# Patient Record
Sex: MALE | Race: WHITE | NOT HISPANIC OR LATINO
[De-identification: names, ages, dates, MRNs, and addresses within clinical notes are randomized per-mention and may not be internally consistent; named-entity substitution may affect disease eponyms.]

---

## 2017-08-19 ENCOUNTER — TRANSCRIPTION ENCOUNTER (OUTPATIENT)
Age: 52
End: 2017-08-19

## 2019-07-08 ENCOUNTER — APPOINTMENT (OUTPATIENT)
Dept: OTOLARYNGOLOGY | Facility: CLINIC | Age: 54
End: 2019-07-08

## 2019-10-03 ENCOUNTER — TRANSCRIPTION ENCOUNTER (OUTPATIENT)
Age: 54
End: 2019-10-03

## 2019-10-25 ENCOUNTER — INPATIENT (INPATIENT)
Facility: HOSPITAL | Age: 54
LOS: 11 days | Discharge: HOME HEALTH | End: 2019-11-06
Attending: INTERNAL MEDICINE | Admitting: INTERNAL MEDICINE
Payer: COMMERCIAL

## 2019-10-25 VITALS
WEIGHT: 315 LBS | HEIGHT: 70 IN | TEMPERATURE: 98 F | SYSTOLIC BLOOD PRESSURE: 114 MMHG | DIASTOLIC BLOOD PRESSURE: 81 MMHG | HEART RATE: 170 BPM | RESPIRATION RATE: 20 BRPM | OXYGEN SATURATION: 98 %

## 2019-10-25 PROCEDURE — 99285 EMERGENCY DEPT VISIT HI MDM: CPT

## 2019-10-25 PROCEDURE — 93010 ELECTROCARDIOGRAM REPORT: CPT

## 2019-10-25 RX ORDER — DILTIAZEM HCL 120 MG
10 CAPSULE, EXT RELEASE 24 HR ORAL ONCE
Refills: 0 | Status: COMPLETED | OUTPATIENT
Start: 2019-10-25 | End: 2019-10-25

## 2019-10-25 NOTE — ED ADULT TRIAGE NOTE - CHIEF COMPLAINT QUOTE
Sent in BIBA from Urgent care for sob on excretion, pt placed on 4L ns by ems for SOB. pt had a  in Rapid A fib. Sent in BIBA from Urgent care for sob on excretion, pt placed on 4L ns by ems for SOB. pt had a  in Rapid A fib. pt had an ablation 5 years ago for hx of rapid a flutter

## 2019-10-26 DIAGNOSIS — Z98.890 OTHER SPECIFIED POSTPROCEDURAL STATES: Chronic | ICD-10-CM

## 2019-10-26 DIAGNOSIS — R09.89 OTHER SPECIFIED SYMPTOMS AND SIGNS INVOLVING THE CIRCULATORY AND RESPIRATORY SYSTEMS: ICD-10-CM

## 2019-10-26 DIAGNOSIS — Z94.5 SKIN TRANSPLANT STATUS: Chronic | ICD-10-CM

## 2019-10-26 LAB
ALBUMIN SERPL ELPH-MCNC: 3.1 G/DL — LOW (ref 3.5–5.2)
ALBUMIN SERPL ELPH-MCNC: 3.4 G/DL — LOW (ref 3.5–5.2)
ALP SERPL-CCNC: 65 U/L — SIGNIFICANT CHANGE UP (ref 30–115)
ALP SERPL-CCNC: 71 U/L — SIGNIFICANT CHANGE UP (ref 30–115)
ALT FLD-CCNC: 44 U/L — HIGH (ref 0–41)
ALT FLD-CCNC: 46 U/L — HIGH (ref 0–41)
ANION GAP SERPL CALC-SCNC: 15 MMOL/L — HIGH (ref 7–14)
ANION GAP SERPL CALC-SCNC: 17 MMOL/L — HIGH (ref 7–14)
APTT BLD: 28.5 SEC — SIGNIFICANT CHANGE UP (ref 27–39.2)
APTT BLD: 35.9 SEC — SIGNIFICANT CHANGE UP (ref 27–39.2)
APTT BLD: 91.7 SEC — CRITICAL HIGH (ref 27–39.2)
AST SERPL-CCNC: 43 U/L — HIGH (ref 0–41)
AST SERPL-CCNC: 45 U/L — HIGH (ref 0–41)
BASE EXCESS BLDV CALC-SCNC: -5 MMOL/L — LOW (ref -2–2)
BASOPHILS # BLD AUTO: 0.11 K/UL — SIGNIFICANT CHANGE UP (ref 0–0.2)
BASOPHILS NFR BLD AUTO: 1 % — SIGNIFICANT CHANGE UP (ref 0–1)
BILIRUB SERPL-MCNC: 1.1 MG/DL — SIGNIFICANT CHANGE UP (ref 0.2–1.2)
BILIRUB SERPL-MCNC: 1.1 MG/DL — SIGNIFICANT CHANGE UP (ref 0.2–1.2)
BUN SERPL-MCNC: 49 MG/DL — HIGH (ref 10–20)
BUN SERPL-MCNC: 51 MG/DL — HIGH (ref 10–20)
CA-I SERPL-SCNC: 1.17 MMOL/L — SIGNIFICANT CHANGE UP (ref 1.12–1.3)
CALCIUM SERPL-MCNC: 8.7 MG/DL — SIGNIFICANT CHANGE UP (ref 8.5–10.1)
CALCIUM SERPL-MCNC: 9.1 MG/DL — SIGNIFICANT CHANGE UP (ref 8.5–10.1)
CHLORIDE SERPL-SCNC: 104 MMOL/L — SIGNIFICANT CHANGE UP (ref 98–110)
CHLORIDE SERPL-SCNC: 105 MMOL/L — SIGNIFICANT CHANGE UP (ref 98–110)
CHOLEST SERPL-MCNC: 72 MG/DL — LOW (ref 100–200)
CO2 SERPL-SCNC: 19 MMOL/L — SIGNIFICANT CHANGE UP (ref 17–32)
CO2 SERPL-SCNC: 20 MMOL/L — SIGNIFICANT CHANGE UP (ref 17–32)
CREAT SERPL-MCNC: 1.7 MG/DL — HIGH (ref 0.7–1.5)
CREAT SERPL-MCNC: 2.1 MG/DL — HIGH (ref 0.7–1.5)
D DIMER BLD IA.RAPID-MCNC: 786 NG/ML DDU — HIGH (ref 0–230)
EOSINOPHIL # BLD AUTO: 0.12 K/UL — SIGNIFICANT CHANGE UP (ref 0–0.7)
EOSINOPHIL NFR BLD AUTO: 1.1 % — SIGNIFICANT CHANGE UP (ref 0–8)
ESTIMATED AVERAGE GLUCOSE: 131 MG/DL — HIGH (ref 68–114)
GAS PNL BLDV: 138 MMOL/L — SIGNIFICANT CHANGE UP (ref 136–145)
GAS PNL BLDV: SIGNIFICANT CHANGE UP
GLUCOSE SERPL-MCNC: 97 MG/DL — SIGNIFICANT CHANGE UP (ref 70–99)
GLUCOSE SERPL-MCNC: 99 MG/DL — SIGNIFICANT CHANGE UP (ref 70–99)
HBA1C BLD-MCNC: 6.2 % — HIGH (ref 4–5.6)
HCO3 BLDV-SCNC: 21 MMOL/L — LOW (ref 22–29)
HCT VFR BLD CALC: 42.6 % — SIGNIFICANT CHANGE UP (ref 42–52)
HCT VFR BLD CALC: 45.6 % — SIGNIFICANT CHANGE UP (ref 42–52)
HCT VFR BLDA CALC: 45.2 % — HIGH (ref 34–44)
HDLC SERPL-MCNC: 17 MG/DL — LOW
HGB BLD CALC-MCNC: 14.8 G/DL — SIGNIFICANT CHANGE UP (ref 14–18)
HGB BLD-MCNC: 13.2 G/DL — LOW (ref 14–18)
HGB BLD-MCNC: 14.1 G/DL — SIGNIFICANT CHANGE UP (ref 14–18)
IMM GRANULOCYTES NFR BLD AUTO: 0.5 % — HIGH (ref 0.1–0.3)
INR BLD: 1.48 RATIO — HIGH (ref 0.65–1.3)
LACTATE BLDV-MCNC: 2.5 MMOL/L — HIGH (ref 0.5–1.6)
LIPID PNL WITH DIRECT LDL SERPL: 47 MG/DL — SIGNIFICANT CHANGE UP (ref 4–129)
LYMPHOCYTES # BLD AUTO: 2.66 K/UL — SIGNIFICANT CHANGE UP (ref 1.2–3.4)
LYMPHOCYTES # BLD AUTO: 23.7 % — SIGNIFICANT CHANGE UP (ref 20.5–51.1)
MAGNESIUM SERPL-MCNC: 1.9 MG/DL — SIGNIFICANT CHANGE UP (ref 1.8–2.4)
MCHC RBC-ENTMCNC: 28.1 PG — SIGNIFICANT CHANGE UP (ref 27–31)
MCHC RBC-ENTMCNC: 28.1 PG — SIGNIFICANT CHANGE UP (ref 27–31)
MCHC RBC-ENTMCNC: 30.9 G/DL — LOW (ref 32–37)
MCHC RBC-ENTMCNC: 31 G/DL — LOW (ref 32–37)
MCV RBC AUTO: 90.6 FL — SIGNIFICANT CHANGE UP (ref 80–94)
MCV RBC AUTO: 91 FL — SIGNIFICANT CHANGE UP (ref 80–94)
MONOCYTES # BLD AUTO: 1.21 K/UL — HIGH (ref 0.1–0.6)
MONOCYTES NFR BLD AUTO: 10.8 % — HIGH (ref 1.7–9.3)
NEUTROPHILS # BLD AUTO: 7.07 K/UL — HIGH (ref 1.4–6.5)
NEUTROPHILS NFR BLD AUTO: 62.9 % — SIGNIFICANT CHANGE UP (ref 42.2–75.2)
NRBC # BLD: 0 /100 WBCS — SIGNIFICANT CHANGE UP (ref 0–0)
NRBC # BLD: 0 /100 WBCS — SIGNIFICANT CHANGE UP (ref 0–0)
NT-PROBNP SERPL-SCNC: 3531 PG/ML — HIGH (ref 0–300)
PCO2 BLDV: 43 MMHG — SIGNIFICANT CHANGE UP (ref 41–51)
PH BLDV: 7.3 — SIGNIFICANT CHANGE UP (ref 7.26–7.43)
PLATELET # BLD AUTO: 211 K/UL — SIGNIFICANT CHANGE UP (ref 130–400)
PLATELET # BLD AUTO: 224 K/UL — SIGNIFICANT CHANGE UP (ref 130–400)
PO2 BLDV: 36 MMHG — SIGNIFICANT CHANGE UP (ref 20–40)
POTASSIUM BLDV-SCNC: 4.5 MMOL/L — SIGNIFICANT CHANGE UP (ref 3.3–5.6)
POTASSIUM SERPL-MCNC: 4.6 MMOL/L — SIGNIFICANT CHANGE UP (ref 3.5–5)
POTASSIUM SERPL-MCNC: 5 MMOL/L — SIGNIFICANT CHANGE UP (ref 3.5–5)
POTASSIUM SERPL-SCNC: 4.6 MMOL/L — SIGNIFICANT CHANGE UP (ref 3.5–5)
POTASSIUM SERPL-SCNC: 5 MMOL/L — SIGNIFICANT CHANGE UP (ref 3.5–5)
PROT SERPL-MCNC: 5.4 G/DL — LOW (ref 6–8)
PROT SERPL-MCNC: 5.8 G/DL — LOW (ref 6–8)
PROTHROM AB SERPL-ACNC: 17 SEC — HIGH (ref 9.95–12.87)
RBC # BLD: 4.7 M/UL — SIGNIFICANT CHANGE UP (ref 4.7–6.1)
RBC # BLD: 5.01 M/UL — SIGNIFICANT CHANGE UP (ref 4.7–6.1)
RBC # FLD: 16.2 % — HIGH (ref 11.5–14.5)
RBC # FLD: 16.2 % — HIGH (ref 11.5–14.5)
SAO2 % BLDV: 61 % — SIGNIFICANT CHANGE UP
SODIUM SERPL-SCNC: 140 MMOL/L — SIGNIFICANT CHANGE UP (ref 135–146)
SODIUM SERPL-SCNC: 140 MMOL/L — SIGNIFICANT CHANGE UP (ref 135–146)
TOTAL CHOLESTEROL/HDL RATIO MEASUREMENT: 4.2 RATIO — SIGNIFICANT CHANGE UP (ref 4–5.5)
TRIGL SERPL-MCNC: 64 MG/DL — SIGNIFICANT CHANGE UP (ref 10–149)
TROPONIN T SERPL-MCNC: <0.01 NG/ML — SIGNIFICANT CHANGE UP
TROPONIN T SERPL-MCNC: <0.01 NG/ML — SIGNIFICANT CHANGE UP
TSH SERPL-MCNC: 1.99 UIU/ML — SIGNIFICANT CHANGE UP (ref 0.27–4.2)
WBC # BLD: 11.23 K/UL — HIGH (ref 4.8–10.8)
WBC # BLD: 12.91 K/UL — HIGH (ref 4.8–10.8)
WBC # FLD AUTO: 11.23 K/UL — HIGH (ref 4.8–10.8)
WBC # FLD AUTO: 12.91 K/UL — HIGH (ref 4.8–10.8)

## 2019-10-26 PROCEDURE — 93970 EXTREMITY STUDY: CPT | Mod: 26

## 2019-10-26 PROCEDURE — 99222 1ST HOSP IP/OBS MODERATE 55: CPT

## 2019-10-26 PROCEDURE — 99223 1ST HOSP IP/OBS HIGH 75: CPT

## 2019-10-26 PROCEDURE — 71045 X-RAY EXAM CHEST 1 VIEW: CPT | Mod: 26

## 2019-10-26 RX ORDER — HEPARIN SODIUM 5000 [USP'U]/ML
10000 INJECTION INTRAVENOUS; SUBCUTANEOUS EVERY 6 HOURS
Refills: 0 | Status: DISCONTINUED | OUTPATIENT
Start: 2019-10-26 | End: 2019-10-26

## 2019-10-26 RX ORDER — HEPARIN SODIUM 5000 [USP'U]/ML
5000 INJECTION INTRAVENOUS; SUBCUTANEOUS EVERY 6 HOURS
Refills: 0 | Status: DISCONTINUED | OUTPATIENT
Start: 2019-10-26 | End: 2019-10-26

## 2019-10-26 RX ORDER — METOPROLOL TARTRATE 50 MG
25 TABLET ORAL
Refills: 0 | Status: DISCONTINUED | OUTPATIENT
Start: 2019-10-26 | End: 2019-10-26

## 2019-10-26 RX ORDER — METOPROLOL TARTRATE 50 MG
25 TABLET ORAL EVERY 8 HOURS
Refills: 0 | Status: DISCONTINUED | OUTPATIENT
Start: 2019-10-26 | End: 2019-10-29

## 2019-10-26 RX ORDER — DILTIAZEM HCL 120 MG
10 CAPSULE, EXT RELEASE 24 HR ORAL ONCE
Refills: 0 | Status: COMPLETED | OUTPATIENT
Start: 2019-10-26 | End: 2019-10-26

## 2019-10-26 RX ORDER — LANOLIN ALCOHOL/MO/W.PET/CERES
3 CREAM (GRAM) TOPICAL ONCE
Refills: 0 | Status: COMPLETED | OUTPATIENT
Start: 2019-10-26 | End: 2019-10-26

## 2019-10-26 RX ORDER — FUROSEMIDE 40 MG
40 TABLET ORAL DAILY
Refills: 0 | Status: DISCONTINUED | OUTPATIENT
Start: 2019-10-26 | End: 2019-10-29

## 2019-10-26 RX ORDER — ZOLPIDEM TARTRATE 10 MG/1
5 TABLET ORAL ONCE
Refills: 0 | Status: DISCONTINUED | OUTPATIENT
Start: 2019-10-26 | End: 2019-10-26

## 2019-10-26 RX ORDER — HEPARIN SODIUM 5000 [USP'U]/ML
INJECTION INTRAVENOUS; SUBCUTANEOUS
Qty: 25000 | Refills: 0 | Status: DISCONTINUED | OUTPATIENT
Start: 2019-10-26 | End: 2019-10-26

## 2019-10-26 RX ORDER — DILTIAZEM HCL 120 MG
5 CAPSULE, EXT RELEASE 24 HR ORAL
Qty: 125 | Refills: 0 | Status: DISCONTINUED | OUTPATIENT
Start: 2019-10-26 | End: 2019-10-26

## 2019-10-26 RX ORDER — LANOLIN ALCOHOL/MO/W.PET/CERES
5 CREAM (GRAM) TOPICAL ONCE
Refills: 0 | Status: COMPLETED | OUTPATIENT
Start: 2019-10-26 | End: 2019-10-26

## 2019-10-26 RX ORDER — SODIUM CHLORIDE 9 MG/ML
1000 INJECTION, SOLUTION INTRAVENOUS ONCE
Refills: 0 | Status: COMPLETED | OUTPATIENT
Start: 2019-10-26 | End: 2019-10-26

## 2019-10-26 RX ORDER — APIXABAN 2.5 MG/1
5 TABLET, FILM COATED ORAL EVERY 12 HOURS
Refills: 0 | Status: DISCONTINUED | OUTPATIENT
Start: 2019-10-26 | End: 2019-11-06

## 2019-10-26 RX ORDER — DILTIAZEM HCL 120 MG
15 CAPSULE, EXT RELEASE 24 HR ORAL
Qty: 125 | Refills: 0 | Status: DISCONTINUED | OUTPATIENT
Start: 2019-10-26 | End: 2019-10-30

## 2019-10-26 RX ORDER — HEPARIN SODIUM 5000 [USP'U]/ML
10000 INJECTION INTRAVENOUS; SUBCUTANEOUS ONCE
Refills: 0 | Status: COMPLETED | OUTPATIENT
Start: 2019-10-26 | End: 2019-10-26

## 2019-10-26 RX ADMIN — Medication 5 MILLIGRAM(S): at 04:43

## 2019-10-26 RX ADMIN — Medication 5 MG/HR: at 00:25

## 2019-10-26 RX ADMIN — HEPARIN SODIUM 2400 UNIT(S)/HR: 5000 INJECTION INTRAVENOUS; SUBCUTANEOUS at 02:56

## 2019-10-26 RX ADMIN — SODIUM CHLORIDE 1000 MILLILITER(S): 9 INJECTION, SOLUTION INTRAVENOUS at 01:32

## 2019-10-26 RX ADMIN — Medication 10 MILLIGRAM(S): at 00:10

## 2019-10-26 RX ADMIN — Medication 25 MILLIGRAM(S): at 22:12

## 2019-10-26 RX ADMIN — Medication 40 MILLIGRAM(S): at 14:50

## 2019-10-26 RX ADMIN — Medication 10 MILLIGRAM(S): at 00:17

## 2019-10-26 RX ADMIN — HEPARIN SODIUM 10000 UNIT(S): 5000 INJECTION INTRAVENOUS; SUBCUTANEOUS at 02:54

## 2019-10-26 RX ADMIN — APIXABAN 5 MILLIGRAM(S): 2.5 TABLET, FILM COATED ORAL at 22:12

## 2019-10-26 RX ADMIN — Medication 25 MILLIGRAM(S): at 14:51

## 2019-10-26 RX ADMIN — Medication 15 MG/HR: at 04:20

## 2019-10-26 RX ADMIN — Medication 15 MG/HR: at 07:43

## 2019-10-26 RX ADMIN — SODIUM CHLORIDE 1000 MILLILITER(S): 9 INJECTION, SOLUTION INTRAVENOUS at 00:18

## 2019-10-26 RX ADMIN — Medication 25 MILLIGRAM(S): at 07:44

## 2019-10-26 RX ADMIN — Medication 15 MG/HR: at 16:57

## 2019-10-26 RX ADMIN — ZOLPIDEM TARTRATE 5 MILLIGRAM(S): 10 TABLET ORAL at 22:35

## 2019-10-26 NOTE — ED PROVIDER NOTE - CARE PLAN
Principal Discharge DX:	Atrial fibrillation with RVR  Secondary Diagnosis:	BUCKLEY (dyspnea on exertion)

## 2019-10-26 NOTE — ED ADULT NURSE NOTE - CHEST APPEARANCE
normal Area M Indication Text: Tumors in this location are included in Area M (cheek, forehead, scalp, neck, jawline and pretibial skin).  Mohs surgery is indicated for tumors 1 cm or larger in these anatomic locations.

## 2019-10-26 NOTE — H&P ADULT - NSHPLABSRESULTS_GEN_ALL_CORE
14.1   12.91 )-----------( 224      ( 26 Oct 2019 00:00 )             45.6       10-26    140  |  104  |  51<H>  ----------------------------<  99  5.0   |  19  |  2.1<H>    Ca    9.1      26 Oct 2019 00:00    TPro  5.8<L>  /  Alb  3.4<L>  /  TBili  1.1  /  DBili  x   /  AST  43<H>  /  ALT  46<H>  /  AlkPhos  71  10-26                  PT/INR - ( 26 Oct 2019 00:00 )   PT: 17.00 sec;   INR: 1.48 ratio         PTT - ( 26 Oct 2019 00:00 )  PTT:28.5 sec    Lactate Trend      CARDIAC MARKERS ( 26 Oct 2019 00:00 )  x     / <0.01 ng/mL / x     / x     / x            CAPILLARY BLOOD GLUCOSE      POCT Blood Glucose.: 93 mg/dL (25 Oct 2019 23:52)

## 2019-10-26 NOTE — ED ADULT NURSE NOTE - CHIEF COMPLAINT QUOTE
Sent in BIBA from Urgent care for sob on excretion, pt placed on 4L ns by ems for SOB. pt had a  in Rapid A fib. pt had an ablation 5 years ago for hx of rapid a flutter

## 2019-10-26 NOTE — ED ADULT NURSE NOTE - NSIMPLEMENTINTERV_GEN_ALL_ED
Implemented All Fall with Harm Risk Interventions:  [place] to call system. Call bell, personal items and telephone within reach. Instruct patient to call for assistance. Room bathroom lighting operational. Non-slip footwear when patient is off stretcher. Physically safe environment: no spills, clutter or unnecessary equipment. Stretcher in lowest position, wheels locked, appropriate side rails in place. Provide visual cue, wrist band, yellow gown, etc. Monitor gait and stability. Monitor for mental status changes and reorient to person, place, and time. Review medications for side effects contributing to fall risk. Reinforce activity limits and safety measures with patient and family. Provide visual clues: red socks.

## 2019-10-26 NOTE — CONSULT NOTE ADULT - SUBJECTIVE AND OBJECTIVE BOX
Patient is a 54y old  Male who presents with a chief complaint of       HPI:  53 y/o M w/ PMHx morbid obesity, typical AFL s/p typical  ablation (not AF) has not been on blood thinners since ablation p/w SOB for 3 weeks. Patient noted for the last 3 weeks he has had increased BUCKLEY. Endorses chest tightness when he is short of breath. Recently seen at urgent care and diagnosed with cellulitis of bilateral lower ext and treated with antibiotics. Patient also reports being treated for 'congestion of the lung' where he was coughing and producing sputum. Was given a course of doxycycline after which he reports he feels better with improvement in his coughing and sputum production. Currently denies coughing, shortness of breath, chest pain, nausea, vomiting, diarrhea.          PAST MEDICAL & SURGICAL HISTORY:  Atrial fibrillation and flutter  History of prostate surgery  S/P ablation of atrial fibrillation  H/O skin graft        MEDICATIONS  (STANDING):  diltiazem Infusion 15 mG/Hr (15 mL/Hr) IV Continuous <Continuous>  furosemide   Injectable 40 milliGRAM(s) IV Push daily  heparin  Infusion.  Unit(s)/Hr (24 mL/Hr) IV Continuous <Continuous>  metoprolol tartrate 25 milliGRAM(s) Oral every 8 hours    MEDICATIONS  (PRN):  heparin  Injectable 14983 Unit(s) IV Push every 6 hours PRN For aPTT less than 40  heparin  Injectable 5000 Unit(s) IV Push every 6 hours PRN For aPTT between 40 - 57      FAMILY HISTORY:  FH: myocardial infarction: in father at age 60      SOCIAL HISTORY:    CIGARETTES: none    ALCOHOL: none    Past Surgical History:  Typical AFL ablation    Allergies:    Augmentin (Other)  clindamycin (Other)  penicillins (Other)      REVIEW OF SYSTEMS:    CONSTITUTIONAL: No fever, weight loss, chills, shakes, or fatigue  EYES: No eye pain, visual disturbances, or discharge  BREASTS: No pain, masses, or nipple discharge  RESPIRATORY: No cough, wheezing, hemoptysis, or shortness of breath  CARDIOVASCULAR: No chest pain, dyspnea, palpitations, dizziness, syncope, paroxysmal nocturnal dyspnea, orthopnea, or arm or leg swelling  GASTROINTESTINAL: No abdominal  or epigastric pain, nausea, vomiting, hematemesis, diarrhea, constipation, melena or bright red blood.  GENITOURINARY: No dysuria, nocturia, hematuria, or urinary incontinence  NEUROLOGICAL: No headaches, memory loss, slurred speech, limb weakness, loss of strength, numbness, or tremors  SKIN: No itching, burning, rashes, or lesions   LYMPH NODES: No enlarged glands  ENDOCRINE: No heat or cold intolerance, or hair loss  MUSCULOSKELETAL: No joint pain or swelling, muscle, back, or extremity pain  PSYCHIATRIC: No depression, anxiety, or difficulty sleeping      Vital Signs Last 24 Hrs  T(C): 36.2 (26 Oct 2019 16:04), Max: 36.8 (26 Oct 2019 00:05)  T(F): 97.1 (26 Oct 2019 16:04), Max: 98.2 (26 Oct 2019 00:05)  HR: 90 (26 Oct 2019 16:04) (90 - 170)  BP: 123/83 (26 Oct 2019 16:04) (109/69 - 151/90)  BP(mean): --  RR: 20 (26 Oct 2019 16:04) (18 - 24)  SpO2: 98% (26 Oct 2019 03:32) (98% - 100%)    PHYSICAL EXAM:        GENERAL: In no apparent distress, well nourished, and hydrated.  HEAD:  Atraumatic, Normocephalic  EYES: EOMI, PERRLA, conjunctiva and sclera clear  ENMT: No tonsillar erythema, exudates, or enlargements; ist mucous membranes, Good dentition, No lesions  HEART: Irregular rate and rhythm; No murmurs, rubs, or gallops.  PULMONARY: Clear to auscultation and perfusion.  No rales, wheezing, or rhonchi bilaterally.  ABDOMEN: Soft, Nontender, Nondistended; Bowel sounds present  EXTREMITIES:  2+ Peripheral Pulses, No clubbing, cyanosis, or edema    NEUROLOGICAL: Grossly nonfocal      INTERPRETATION OF TELEMETRY:    ECG:    < from: 12 Lead ECG (10.25.19 @ 23:30) >    Ventricular Rate 159 BPM    Atrial Rate 166 BPM    QRS Duration 100 ms    Q-T Interval 248 ms    QTC Calculation(Bezet) 403 ms    R Axis 101 degrees    T Axis -51 degrees    Diagnosis Line Atrial fibrillation with rapid ventricular response  Rightward axis  Nonspecific T wave abnormality  Abnormal ECG    < end of copied text >      I&O's Detail    26 Oct 2019 07:01  -  26 Oct 2019 19:15  --------------------------------------------------------  IN:    Oral Fluid: 240 mL  Total IN: 240 mL    OUT:    Voided: 100 mL  Total OUT: 100 mL    Total NET: 140 mL          LABS:                        13.2   11.23 )-----------( 211      ( 26 Oct 2019 05:39 )             42.6     10-26    140  |  105  |  49<H>  ----------------------------<  97  4.6   |  20  |  1.7<H>    Ca    8.7      26 Oct 2019 05:39  Mg     1.9     10-26    TPro  5.4<L>  /  Alb  3.1<L>  /  TBili  1.1  /  DBili  x   /  AST  45<H>  /  ALT  44<H>  /  AlkPhos  65  10-26    CARDIAC MARKERS ( 26 Oct 2019 11:55 )  x     / <0.01 ng/mL / x     / x     / x      CARDIAC MARKERS ( 26 Oct 2019 00:00 )  x     / <0.01 ng/mL / x     / x     / x          PT/INR - ( 26 Oct 2019 00:00 )   PT: 17.00 sec;   INR: 1.48 ratio         PTT - ( 26 Oct 2019 11:55 )  PTT:91.7 sec    BNPSerum Pro-Brain Natriuretic Peptide: 3531 pg/mL (10-26 @ 00:00)    I&O's Detail    26 Oct 2019 07:01  -  26 Oct 2019 19:15  --------------------------------------------------------  IN:    Oral Fluid: 240 mL  Total IN: 240 mL    OUT:    Voided: 100 mL  Total OUT: 100 mL    Total NET: 140 mL        Daily Height in cm: 177.8 (26 Oct 2019 00:16)    Daily Weight in k.2 (26 Oct 2019 04:00)    RADIOLOGY & ADDITIONAL STUDIES:

## 2019-10-26 NOTE — ED PROVIDER NOTE - ATTENDING CONTRIBUTION TO CARE
54 year old male, pmhx of BMI greater than 35, patient weighs approx 560 pounds, comes in with afib rvr, patient has a history paroxysmal afib, no cp, + mild sob and BUCKLEY, no loc, no hemopstysis    CONSTITUTIONAL: Well-developed; well-nourished; in no acute distress. Sitting up and providing appropriate history and physical examination  SKIN: skin exam is warm and dry, no acute rash.  HEAD: Normocephalic; atraumatic.  EYES: PERRL, 3 mm bilateral, no nystagmus, EOM intact; conjunctiva and sclera clear.  ENT: No nasal discharge; airway clear.  NECK: Supple; non tender. + full passive ROM in all directions. No JVD  CARD: S1, S2 normal; no murmurs, gallops, or rubs. + AFIB RVR. + Symmetric Strong Pulses  RESP: No wheezes, rales or rhonchi. Good air movement bilaterally  ABD: soft; non-distended; non-tender. No Rebound, No Guarding, No signs of peritonitis, No CVA tenderness. No pulsatile abdominal mass. + Strong and Symmetric Pulses  EXT: Normal ROM. No clubbing, cyanosis or edema. Dp and Pt Pulses intact. Cap refill less than 3 seconds  NEURO: CN 2-12 intact, normal finger to nose, normal romberg, stable gait, no sensory or motor deficits, Alert, oriented, grossly unremarkable. No Focal deficits. GCS 15. NIH 0  PSYCH: Cooperative, appropriate.

## 2019-10-26 NOTE — ED PROVIDER NOTE - OBJECTIVE STATEMENT
54 year old male w/ a pmh of afib/flutter in the past s/p ablation has not been on blood thinners since ablation presents here c/o sob. Patient noted for the last 3 weeks he has had increased BUCKLEY. Patient becomes sob when taking 2 steps. Patient was found to be sating in the 80s by ems when he had to walk to the EMS stretched. Patient was placed on NC 02 and improved to high 90s. Patient endorses cp with exertion but no cp at rest. Patient states last week he had a cough. Denies any fever chills n/v abdominal pain urinary frequency urgency or burning.

## 2019-10-26 NOTE — CONSULT NOTE ADULT - ASSESSMENT
AF with RVR - new onset AF with RVR CHADVASC 0  - start eliqust  - BO/DCCV on Monday  - Pulmonary for MARTIN evolution as outpatient  - cont rate control for now with cardiozem

## 2019-10-26 NOTE — ED ADULT NURSE NOTE - OBJECTIVE STATEMENT
Pt with complaints of SOB even just walking 2 steps. Pt has been having dyspnea on exertion for the past 3 weeks. Pt has been treated for URI for the past week. Pt still with slight cough, denies any fever. Pt also with swelling to both legs up to abdomen and  stasis ulcer to both legs x 3 weeks.

## 2019-10-26 NOTE — H&P ADULT - HISTORY OF PRESENT ILLNESS
53 y/o M w/ PMHx of afib/flutter in the past s/p ablation has not been on blood thinners since ablation presents here c/o sob. Patient noted for the last 3 weeks he has had increased BUCKLEY. Patient becomes sob when taking 2 steps. Patient was found to be sating in the 80s by ems when he had to walk to the EMS stretched. Patient was placed on NC 02 and improved to high 90s. Patient endorses cp with exertion but no cp at rest. Patient states last week he had a cough. Denies any fever chills n/v abdominal pain urinary frequency urgency or burning. 55 y/o M w/ PMHx morbid obesity, afib/flutter in the past s/p ablation has not been on blood thinners since ablation p/w SOB for 3 weeks. Patient noted for the last 3 weeks he has had increased BUCKLEY. Endorses chest tightness when he is short of breath. Recently seen at urgent care and diagnosed with cellulitis of bilateral lower ext 53 y/o M w/ PMHx morbid obesity, afib/flutter in the past s/p ablation has not been on blood thinners since ablation p/w SOB for 3 weeks. Patient noted for the last 3 weeks he has had increased BUCKLEY. Endorses chest tightness when he is short of breath. Recently seen at urgent care and diagnosed with cellulitis of bilateral lower ext and treated with antibiotics. Patient also reports being treated for 'congestion of the lung' where he was coughing and producing sputum. Was given a course of doxycycline after which he reports he feels better with improvement in his coughing and sputum production. Currently denies coughing, shortness of breath, chest pain, nausea, vomiting, diarrhea.

## 2019-10-26 NOTE — H&P ADULT - NSHPPHYSICALEXAM_GEN_ALL_CORE
PHYSICAL EXAM:  GENERAL: NAD, well-developed  HEAD:  Atraumatic, Normocephalic  EYES: EOMI, PERRLA, conjunctiva and sclera clear  NECK: Supple, No JVD  CHEST/LUNG: Clear to auscultation bilaterally; No wheeze, ronchi or rales  HEART: Regular rate and rhythm; No murmurs, rubs, or gallops  ABDOMEN: Soft, Nontender, Nondistended; Bowel sounds present  EXTREMITIES:  2+ Peripheral Pulses, No clubbing, cyanosis, or edema  PSYCH: AAOx3  NEUROLOGY: non-focal  SKIN: No rashes or lesions

## 2019-10-26 NOTE — H&P ADULT - ASSESSMENT
#Afib RVR  - s/p 20mg IV cardizem and now on cardizem drip on max 15mg  - will start 25mg metoprolol  - f/u TSH  - cardiology consult  - heparin drip    #SOB  - PE vs. volume overload  - elevated D-dimer 786 on presentation  - due to patients body habitus cannot go for CT angio  - BNP elevated - cannot rule out heart failure as well  - heparin drip  - f/u lower ext duplex  - f/u ECHO  - Will put in for V/Q scan if possible w/ patient body habitus    #GONZALEZ  - creatinine 2.1 on presentation  - no known history of CKD  - prerenal vs cardiorenal?  - f/u repeat in AM    #Leukocytosis  - possibly secondary to lower ext cellulitis  - patient has chronic lower ext swelling - more recently     #Diet: Regular  #DVT ppx: Heparin  #Activity: ambulate with assistance #Afib RVR  - s/p 20mg IV cardizem and now on cardizem drip on max 15mg  - will start 25mg metoprolol  - f/u TSH  - cardiology consult  - heparin drip    #SOB  - PE vs. volume overload  - elevated D-dimer 786 on presentation  - due to patients body habitus cannot go for CT angio  - BNP elevated - cannot rule out heart failure as well  - heparin drip  - f/u lower ext duplex  - f/u ECHO  - Will put in for V/Q scan if possible w/ patient body habitus    #GONZALEZ  - creatinine 2.1 on presentation  - no known history of CKD  - prerenal vs cardiorenal?  - s/p 1L in ED  - f/u repeat in AM    #Leukocytosis  - possibly secondary to lower ext cellulitis  - patient has chronic lower ext swelling - more recently more erythema and now weeping - possible cellulitis?  - patient also treated for pneumonia  - currently afebrile  - repeat CBC going down, no left shift  - will hold off antibiotics for now    #Diet: Regular  #DVT ppx: Heparin  #Activity: ambulate with assistance

## 2019-10-26 NOTE — H&P ADULT - NSICDXPASTSURGICALHX_GEN_ALL_CORE_FT
PAST SURGICAL HISTORY:  H/O skin graft     History of prostate surgery     S/P ablation of atrial fibrillation

## 2019-10-26 NOTE — ED PROVIDER NOTE - PROGRESS NOTE DETAILS
SR: Concern for PE with elevated D-dimer, cta cancelled due to weight restriction of CT. Anticoagulation started

## 2019-10-26 NOTE — H&P ADULT - ATTENDING COMMENTS
patient seen and examined , agree with pgy 2 assesment and plan except as indicated above, GEN Lying in no acute distress  HEENT Pupils equal and reactive to light and accommodationSupple Neck  PULM decreased entry bilaterally secondary to body habitus   CV s1s2  GI + morbidly obese  EXT +1 pitting edema , +scrotal edema  PSYCH awake alert and oriented x 3  INTEG No Lesions  NEURO MARIO  #Acute lower extremtiy edema . scrotal edema secondary to volume overload secondary to diasotlic dysfunction chf acute secondary to atrial fibrillation with rvr recurrent   ep consult dr verdin   telemetry   cardizem drip   heparin gtt  echcardiogram  #Morbid obesity BMI 76, candidate for bariatric surgery evaluation , patient needs to see dieitian outpatient for further evaluation   #CKD 3 monitor   #Drug monitoring monitor ptt and adjust heparin     Progress Note Handoff    Pending:  EP eval, monitor  hr  ,   Family discussion: patient is of sound mind and agrees to plan fo care discussed with family at bedside who agrees to plan of care    Disposition: Home___

## 2019-10-26 NOTE — ED PROVIDER NOTE - CLINICAL SUMMARY MEDICAL DECISION MAKING FREE TEXT BOX
I personally evaluated the patient. I reviewed the Resident’s or Physician Assistant’s note (as assigned above), and agree with the findings and plan except as documented in my note. patient evaluated for afib with rvr, patient admitted on anticoagulation, patient body habitus preclude suse of CT scan

## 2019-10-26 NOTE — ED PROVIDER NOTE - NS ED ROS FT
Constitutional: See HPI.  Eyes: No visual changes,  ENMT: No neck pain   Cardiac: see hpi  Respiratory: see hpi  GI: No nausea, vomiting, diarrhea or abdominal pain.  : No dysuria, frequency or burning.   MS: No myalgia, muscle weakness, joint pain or back pain.  Psych: No suicidal or homicidal ideations.  Neuro: No headache

## 2019-10-26 NOTE — ED PROVIDER NOTE - PHYSICAL EXAMINATION
CONSTITUTIONAL: WA / WN / NAD  HEAD: NCAT  EYES: PERRL; EOMI;   ENT: Normal pharynx; mucous membranes pink/moist, no erythema.  NECK: Supple; no meningeal signs  CARD: IRR nl S1/S2; no M/R/G. Pulses equal bilaterally.  RESP: Respiratory rate and effort are normal; breath sounds clear and equal bilaterally.  ABD: Soft, NT ND   MSK/EXT: No gross deformities; full range of motion.  SKIN: Warm and dry;   NEURO: AAOx3  PSYCH: Memory Intact, Normal Affect

## 2019-10-27 LAB
ANION GAP SERPL CALC-SCNC: 14 MMOL/L — SIGNIFICANT CHANGE UP (ref 7–14)
APTT BLD: 28.8 SEC — SIGNIFICANT CHANGE UP (ref 27–39.2)
BUN SERPL-MCNC: 49 MG/DL — HIGH (ref 10–20)
CALCIUM SERPL-MCNC: 8.8 MG/DL — SIGNIFICANT CHANGE UP (ref 8.5–10.1)
CHLORIDE SERPL-SCNC: 104 MMOL/L — SIGNIFICANT CHANGE UP (ref 98–110)
CO2 SERPL-SCNC: 22 MMOL/L — SIGNIFICANT CHANGE UP (ref 17–32)
CREAT SERPL-MCNC: 1.8 MG/DL — HIGH (ref 0.7–1.5)
GLUCOSE SERPL-MCNC: 121 MG/DL — HIGH (ref 70–99)
HCT VFR BLD CALC: 42.6 % — SIGNIFICANT CHANGE UP (ref 42–52)
HGB BLD-MCNC: 13.2 G/DL — LOW (ref 14–18)
MCHC RBC-ENTMCNC: 28.2 PG — SIGNIFICANT CHANGE UP (ref 27–31)
MCHC RBC-ENTMCNC: 31 G/DL — LOW (ref 32–37)
MCV RBC AUTO: 91 FL — SIGNIFICANT CHANGE UP (ref 80–94)
NRBC # BLD: 0 /100 WBCS — SIGNIFICANT CHANGE UP (ref 0–0)
PLATELET # BLD AUTO: 229 K/UL — SIGNIFICANT CHANGE UP (ref 130–400)
POTASSIUM SERPL-MCNC: 4.6 MMOL/L — SIGNIFICANT CHANGE UP (ref 3.5–5)
POTASSIUM SERPL-SCNC: 4.6 MMOL/L — SIGNIFICANT CHANGE UP (ref 3.5–5)
RBC # BLD: 4.68 M/UL — LOW (ref 4.7–6.1)
RBC # FLD: 16.4 % — HIGH (ref 11.5–14.5)
SODIUM SERPL-SCNC: 140 MMOL/L — SIGNIFICANT CHANGE UP (ref 135–146)
WBC # BLD: 11.5 K/UL — HIGH (ref 4.8–10.8)
WBC # FLD AUTO: 11.5 K/UL — HIGH (ref 4.8–10.8)

## 2019-10-27 PROCEDURE — 99232 SBSQ HOSP IP/OBS MODERATE 35: CPT

## 2019-10-27 PROCEDURE — 99233 SBSQ HOSP IP/OBS HIGH 50: CPT

## 2019-10-27 PROCEDURE — 93306 TTE W/DOPPLER COMPLETE: CPT | Mod: 26

## 2019-10-27 RX ORDER — FUROSEMIDE 40 MG
40 TABLET ORAL ONCE
Refills: 0 | Status: COMPLETED | OUTPATIENT
Start: 2019-10-27 | End: 2019-10-27

## 2019-10-27 RX ORDER — SENNA PLUS 8.6 MG/1
1 TABLET ORAL DAILY
Refills: 0 | Status: DISCONTINUED | OUTPATIENT
Start: 2019-10-27 | End: 2019-11-06

## 2019-10-27 RX ADMIN — Medication 15 MG/HR: at 10:49

## 2019-10-27 RX ADMIN — APIXABAN 5 MILLIGRAM(S): 2.5 TABLET, FILM COATED ORAL at 05:50

## 2019-10-27 RX ADMIN — APIXABAN 5 MILLIGRAM(S): 2.5 TABLET, FILM COATED ORAL at 17:18

## 2019-10-27 RX ADMIN — Medication 5 MILLIGRAM(S): at 18:03

## 2019-10-27 RX ADMIN — Medication 25 MILLIGRAM(S): at 21:36

## 2019-10-27 RX ADMIN — Medication 25 MILLIGRAM(S): at 15:36

## 2019-10-27 RX ADMIN — Medication 40 MILLIGRAM(S): at 05:50

## 2019-10-27 RX ADMIN — SENNA PLUS 1 TABLET(S): 8.6 TABLET ORAL at 18:03

## 2019-10-27 RX ADMIN — Medication 25 MILLIGRAM(S): at 05:50

## 2019-10-27 RX ADMIN — Medication 40 MILLIGRAM(S): at 15:36

## 2019-10-27 NOTE — PROGRESS NOTE ADULT - SUBJECTIVE AND OBJECTIVE BOX
INTERVAL HPI/OVERNIGHT EVENTS:    Patient still in AF wit RVR    MEDICATIONS  (STANDING):  apixaban 5 milliGRAM(s) Oral every 12 hours  diltiazem Infusion 15 mG/Hr (15 mL/Hr) IV Continuous <Continuous>  furosemide   Injectable 40 milliGRAM(s) IV Push daily  metoprolol tartrate 25 milliGRAM(s) Oral every 8 hours  senna 1 Tablet(s) Oral daily    MEDICATIONS  (PRN):  bisacodyl 5 milliGRAM(s) Oral every 12 hours PRN Constipation      Allergies    Augmentin (Other)  clindamycin (Other)  penicillins (Other)    Intolerances          Vital Signs Last 24 Hrs  T(C): 36.2 (27 Oct 2019 17:32), Max: 36.7 (27 Oct 2019 00:28)  T(F): 97.2 (27 Oct 2019 15:50), Max: 98 (27 Oct 2019 00:28)  HR: 83 (27 Oct 2019 17:32) (74 - 132)  BP: 130/82 (27 Oct 2019 17:32) (108/77 - 165/103)  BP(mean): --  RR: 18 (27 Oct 2019 17:32) (18 - 20)  SpO2: --    10-26-19 @ 07:01  -  10-27-19 @ 07:00  --------------------------------------------------------  IN: 240 mL / OUT: 100 mL / NET: 140 mL        Physical Exam    GENERAL: In no apparent distress, well nourished, and hydrated.  HEAD:  Atraumatic, Normocephalic  EYES: EOMI, PERRLA, conjunctiva and sclera clear  ENMT: No tonsillar erythema, exudates, or enlargements; ist mucous membranes, Good dentition, No lesions  NECK: Supple and normal thyroid.  No JVD or carotid bruit.  Carotid pulse is 2+ bilaterally.  HEART: Regular rate and rhythm; No murmurs, rubs, or gallops.  PULMONARY: Clear to auscultation and perfusion.  No rales, wheezing, or rhonchi bilaterally.  ABDOMEN: Soft, Nontender, Nondistended; Bowel sounds present  EXTREMITIES:  2+ Peripheral Pulses, No clubbing, cyanosis, or edema  LYMPH: No lymphadenopathy noted  NEUROLOGICAL: Grossly nonfocal    LABS:                        13.2   11.50 )-----------( 229      ( 27 Oct 2019 05:50 )             42.6     10-27    140  |  104  |  49<H>  ----------------------------<  121<H>  4.6   |  22  |  1.8<H>    Ca    8.8      27 Oct 2019 05:50  Mg     1.9     10-26    TPro  5.4<L>  /  Alb  3.1<L>  /  TBili  1.1  /  DBili  x   /  AST  45<H>  /  ALT  44<H>  /  AlkPhos  65  10-26    PT/INR - ( 26 Oct 2019 00:00 )   PT: 17.00 sec;   INR: 1.48 ratio         PTT - ( 27 Oct 2019 05:50 )  PTT:28.8 sec      RADIOLOGY & ADDITIONAL TESTS:

## 2019-10-27 NOTE — PROGRESS NOTE ADULT - SUBJECTIVE AND OBJECTIVE BOX
ADAL FRENCH  54y  [facility] [id number]      Patient is a 54y old  Male who presents with a chief complaint of     INTERVAL HPI/OVERNIGHT EVENTS:      no acute events overnight , stable   REVIEW OF SYSTEMS:  CONSTITUTIONAL: No fever, weight loss, or fatigue  EYES: No eye pain, visual disturbances, or discharge  ENMT:  No difficulty hearing, tinnitus, vertigo; No sinus or throat pain  NECK: No pain or stiffness  BREASTS: No pain, masses, or nipple discharge  RESPIRATORY: No cough, wheezing, chills or hemoptysis; No shortness of breath  CARDIOVASCULAR: No chest pain, palpitations, dizziness, or leg swelling  GASTROINTESTINAL: No abdominal or epigastric pain. No nausea, vomiting, or hematemesis; No diarrhea or constipation. No melena or hematochezia.  GENITOURINARY: No dysuria, frequency, hematuria, or incontinence  NEUROLOGICAL: No headaches, memory loss, loss of strength, numbness, or tremors  SKIN: No itching, burning, rashes, or lesions   LYMPH NODES: No enlarged glands  ENDOCRINE: No heat or cold intolerance; No hair loss  MUSCULOSKELETAL: No joint pain or swelling; No muscle, back, or extremity pain  PSYCHIATRIC: No depression, anxiety, mood swings, or difficulty sleeping  HEME/LYMPH: No easy bruising, or bleeding gums  ALLERY AND IMMUNOLOGIC: No hives or eczema  FAMILY HISTORY:  FH: myocardial infarction: in father at age 60    T(C): 35.8 (10-27-19 @ 10:05), Max: 36.7 (10-27-19 @ 00:28)  HR: 102 (10-27-19 @ 12:05) (74 - 132)  BP: 144/83 (10-27-19 @ 12:05) (108/77 - 148/99)  RR: 18 (10-27-19 @ 12:05) (18 - 20)  SpO2: --  Wt(kg): --Vital Signs Last 24 Hrs  T(C): 35.8 (27 Oct 2019 10:05), Max: 36.7 (27 Oct 2019 00:28)  T(F): 96.4 (27 Oct 2019 10:05), Max: 98 (27 Oct 2019 00:28)  HR: 102 (27 Oct 2019 12:05) (74 - 132)  BP: 144/83 (27 Oct 2019 12:05) (108/77 - 148/99)  BP(mean): --  RR: 18 (27 Oct 2019 12:05) (18 - 20)  SpO2: --    PHYSICAL EXAM:  GENERAL: NAD,   HEAD:  Atraumatic, Normocephalic  EYES: EOMI, PERRLA, conjunctiva and sclera clear  ENMT: No tonsillar erythema, exudates, or enlargement;  NECK: Supple, No JVD, Normal thyroid  NERVOUS SYSTEM:  Alert & Oriented X3  PULM: Clear to auscultation bilaterally  CARDIAC: Regular rate and rhythm; No murmurs, rubs, or gallops  GI: Soft, Nontender, Nondistended; Bowel sounds present, morbid obesity   Scrotal edema unchanged  EXTREMITIES:  2+ Peripheral Pulses, No clubbing, cyanosis, or edema  LYMPH: No lymphadenopathy noted  SKIN: No rashes or lesions    Consultant(s) Notes Reviewed:  [x ] YES  [ ] NO  Care Discussed with Consultants/Other Providers [ x] YES  [ ] NO    LABS:                            13.2   11.50 )-----------( 229      ( 27 Oct 2019 05:50 )             42.6   10-27    140  |  104  |  49<H>  ----------------------------<  121<H>  4.6   |  22  |  1.8<H>    Ca    8.8      27 Oct 2019 05:50  Mg     1.9     10-26    TPro  5.4<L>  /  Alb  3.1<L>  /  TBili  1.1  /  DBili  x   /  AST  45<H>  /  ALT  44<H>  /  AlkPhos  65  10-26            apixaban 5 milliGRAM(s) Oral every 12 hours  diltiazem Infusion 15 mG/Hr IV Continuous <Continuous>  furosemide   Injectable 40 milliGRAM(s) IV Push daily  metoprolol tartrate 25 milliGRAM(s) Oral every 8 hours      HEALTH ISSUES - PROBLEM Dx:  Suspected pulmonary embolism          Case Discussed with House Staff NPO after midnight   Spectra [phone]

## 2019-10-27 NOTE — PROGRESS NOTE ADULT - ASSESSMENT
#Acute lower extremtiy edema . scrotal edema secondary to volume overload secondary to diasotlic dysfunction chf acute secondary to atrial fibrillation with rvr recurrent   telemetry   cardizem drip   EP plan for electric cardioversion   reviewed tele monitoring , hr max 150   npo after midnight   cw metoprolol, furosemide  #Anemia - no indication for transfusion at this time   #Morbid obesity BMI 76, candidate for bariatric surgery evaluation , patient needs to see dieitian outpatient for further evaluation   #GONZALEZ onCKD 3 resolved from 2.1 -> 1.7, monitor   #Given Afib and Obesity - highly likely MARTIN - needs sleep study outpatient  Progress Note Handoff    Pending:  BO/DCCV in am   Family discussion: patient is of sound mind and agrees to plan of care    Disposition: Home___

## 2019-10-27 NOTE — PROGRESS NOTE ADULT - ASSESSMENT
AF with RVR  - cont RVR  - cont cardiozem drip  - BO/DCCV        - I have spoke to anesthesia to see patient prior to DCCV and evaluate airway

## 2019-10-28 LAB
HCT VFR BLD CALC: 41 % — LOW (ref 42–52)
HCV AB S/CO SERPL IA: 0.14 S/CO — SIGNIFICANT CHANGE UP (ref 0–0.99)
HCV AB SERPL-IMP: SIGNIFICANT CHANGE UP
HGB BLD-MCNC: 12.6 G/DL — LOW (ref 14–18)
MCHC RBC-ENTMCNC: 28 PG — SIGNIFICANT CHANGE UP (ref 27–31)
MCHC RBC-ENTMCNC: 30.7 G/DL — LOW (ref 32–37)
MCV RBC AUTO: 91.1 FL — SIGNIFICANT CHANGE UP (ref 80–94)
NRBC # BLD: 0 /100 WBCS — SIGNIFICANT CHANGE UP (ref 0–0)
PLATELET # BLD AUTO: 190 K/UL — SIGNIFICANT CHANGE UP (ref 130–400)
RBC # BLD: 4.5 M/UL — LOW (ref 4.7–6.1)
RBC # FLD: 16.2 % — HIGH (ref 11.5–14.5)
WBC # BLD: 11.04 K/UL — HIGH (ref 4.8–10.8)
WBC # FLD AUTO: 11.04 K/UL — HIGH (ref 4.8–10.8)

## 2019-10-28 PROCEDURE — 99232 SBSQ HOSP IP/OBS MODERATE 35: CPT

## 2019-10-28 PROCEDURE — 99233 SBSQ HOSP IP/OBS HIGH 50: CPT

## 2019-10-28 RX ADMIN — Medication 25 MILLIGRAM(S): at 06:36

## 2019-10-28 RX ADMIN — Medication 25 MILLIGRAM(S): at 15:11

## 2019-10-28 RX ADMIN — APIXABAN 5 MILLIGRAM(S): 2.5 TABLET, FILM COATED ORAL at 06:36

## 2019-10-28 RX ADMIN — SENNA PLUS 1 TABLET(S): 8.6 TABLET ORAL at 11:32

## 2019-10-28 RX ADMIN — Medication 25 MILLIGRAM(S): at 22:03

## 2019-10-28 RX ADMIN — Medication 15 MG/HR: at 16:56

## 2019-10-28 RX ADMIN — Medication 40 MILLIGRAM(S): at 06:36

## 2019-10-28 RX ADMIN — APIXABAN 5 MILLIGRAM(S): 2.5 TABLET, FILM COATED ORAL at 17:10

## 2019-10-28 NOTE — PROGRESS NOTE ADULT - SUBJECTIVE AND OBJECTIVE BOX
ADAL FRENCH  54y Male    CHIEF COMPLAINT:    Patient is a 54y old  Male who presents with a chief complaint of sob.     INTERVAL HPI/OVERNIGHT EVENTS:    Patient seen and examined. Feels better today. No sob. No palpitations. Possible CV tomorrow. Due to obesity pt cannot fit on the table in cath lab.     ROS: All other systems are negative.    Vital Signs:    T(F): 96.6 (10-28-19 @ 12:38), Max: 98 (10-28-19 @ 06:12)  HR: 105 (10-28-19 @ 12:38) (65 - 105)  BP: 128/90 (10-28-19 @ 12:38) (119/73 - 171/78)  RR: 20 (10-28-19 @ 12:38) (18 - 20)  SpO2: 97% (10-27-19 @ 20:00) (97% - 97%)  I&O's Summary    27 Oct 2019 07:  -  28 Oct 2019 07:00  --------------------------------------------------------  IN: 165 mL / OUT: 1000 mL / NET: -835 mL    28 Oct 2019 07:  -  28 Oct 2019 14:58  --------------------------------------------------------  IN: 0 mL / OUT: 2000 mL / NET: -2000 mL      Daily Height in cm: 177.8 (27 Oct 2019 17:32)    Daily Weight in k (28 Oct 2019 06:12)  CAPILLARY BLOOD GLUCOSE          PHYSICAL EXAM:    GENERAL:  NAD  SKIN: No rashes or lesions  HENT: Atraumatic Normocephalic. PERRL. Moist membranes.  NECK: Supple, No JVD. No lymphadenopathy.  PULMONARY: CTA B/L. No wheezing. No rales  CVS: Normal S1, S2. Rate and Rhythm are regular. No murmurs.  ABDOMEN/GI: Soft, Nontender, Nondistended; BS present  EXTREMITIES: Peripheral pulses intact. 2+ pitting edema B/L LE. Ch stasis ulcers B/L  NEUROLOGIC:  No motor or sensory deficit.  PSYCH: Alert & oriented x 3    Consultant(s) Notes Reviewed:  [x ] YES  [ ] NO  Care Discussed with Consultants/Other Providers [ x] YES  [ ] NO    EKG reviewed  Telemetry reviewed    LABS:                        12.6   11.04 )-----------( 190      ( 28 Oct 2019 05:46 )             41.0     10-27    140  |  104  |  49<H>  ----------------------------<  121<H>   Creatinine Trend: 1.8<--, 1.7<--, 2.1<--  4.6   |  22  |  1.8<H>    Ca    8.8      27 Oct 2019 05:50      PTT - ( 27 Oct 2019 05:50 )  PTT:28.8 sec  Serum Pro-Brain Natriuretic Peptide: 3531 pg/mL (10-26-19 @ 00:00)    Trop <0.01, CKMB --, CK --, 10-26-19 @ 11:55  Trop <0.01, CKMB --, CK --, 10-26-19 @ 00:00        RADIOLOGY & ADDITIONAL TESTS:    < from: Transthoracic Echocardiogram (10.27.19 @ 15:56) >    Summary:   1. Left ventricular ejection fraction, by visual estimation, is 25 to   30%.   2. Severely decreased global left ventricular systolic function.   3. Multiple left ventricular regional wall motion abnormalities exist.   See wall motion findings.   4. Elevated left atrial and left ventricular end-diastolic pressures.   5. Mildly increased left ventricular internal cavity size.   6. Spectral Doppler shows restrictive pattern of left ventricular   myocardial filling (Grade III diastolic dysfunction).   7. Moderately enlarged right ventricle.   8. Moderately reduced RV systolic function.   9. Moderately enlarged right atrium.  10. Moderate to severe mitral valve regurgitation.  11. LA volume Index is 50.0 ml/m² ml/m2.    < end of copied text >    Imaging or report Personally Reviewed:  [ ] YES  [ ] NO    Medications:  Standing  apixaban 5 milliGRAM(s) Oral every 12 hours  diltiazem Infusion 15 mG/Hr IV Continuous <Continuous>  furosemide   Injectable 40 milliGRAM(s) IV Push daily  metoprolol tartrate 25 milliGRAM(s) Oral every 8 hours  senna 1 Tablet(s) Oral daily    PRN Meds  bisacodyl 5 milliGRAM(s) Oral every 12 hours PRN      Case discussed with resident    Care discussed with pt/family

## 2019-10-28 NOTE — PROGRESS NOTE ADULT - SUBJECTIVE AND OBJECTIVE BOX
INTERVAL HPI/OVERNIGHT EVENTS:  Still in AF, 80-115s  symptomatic  Anesthesia evaluated yesterday, recommend BO/DCCV to be done in acute settings such as a procedure room or OR  will plan for tomorrow    MEDICATIONS  (STANDING):  apixaban 5 milliGRAM(s) Oral every 12 hours  diltiazem Infusion 15 mG/Hr (15 mL/Hr) IV Continuous <Continuous>  furosemide   Injectable 40 milliGRAM(s) IV Push daily  metoprolol tartrate 25 milliGRAM(s) Oral every 8 hours  senna 1 Tablet(s) Oral daily    MEDICATIONS  (PRN):  bisacodyl 5 milliGRAM(s) Oral every 12 hours PRN Constipation      Allergies    Augmentin (Other)  clindamycin (Other)  penicillins (Other)    Intolerances        REVIEW OF SYSTEMS    [x ] A ten-point review of systems was otherwise negative except as noted.      Vital Signs Last 24 Hrs  T(C): 35.9 (28 Oct 2019 12:38), Max: 36.7 (28 Oct 2019 06:12)  T(F): 96.6 (28 Oct 2019 12:38), Max: 98 (28 Oct 2019 06:12)  HR: 105 (28 Oct 2019 12:38) (65 - 106)  BP: 128/90 (28 Oct 2019 12:38) (119/73 - 171/78)  BP(mean): --  RR: 20 (28 Oct 2019 12:38) (18 - 20)  SpO2: 97% (27 Oct 2019 20:00) (97% - 97%)    10-27-19 @ 07:01  -  10-28-19 @ 07:00  --------------------------------------------------------  IN: 165 mL / OUT: 1000 mL / NET: -835 mL    10-28-19 @ 07:01  -  10-28-19 @ 13:20  --------------------------------------------------------  IN: 0 mL / OUT: 2000 mL / NET: -2000 mL        PHYSICAL EXAM:    GENERAL: In no apparent distress, well nourished, and hydrated.  HEART: IRRegular rate and rhythm; No murmur; NO rubs, or gallops.  PULMONARY: decreased BS, poor efford,  No rales, wheezing, or rhonchi bilaterally.  ABDOMEN: Obese, Soft, Nontender, Nondistended; Bowel sounds present  EXTREMITIES:  Extremities warm, pink, well-perfused, 1+ Peripheral Pulses, 4+ edema  NEUROLOGICAL: alert & oriented x 3, no focal deficits, PERRLA, EOMI    LABS:                        12.6   11.04 )-----------( 190      ( 28 Oct 2019 05:46 )             41.0     10-27    140  |  104  |  49<H>  ----------------------------<  121<H>  4.6   |  22  |  1.8<H>    Ca    8.8      27 Oct 2019 05:50      PTT - ( 27 Oct 2019 05:50 )  PTT:28.8 sec      RADIOLOGY & ADDITIONAL TESTS:  < from: Transthoracic Echocardiogram (10.27.19 @ 15:56) >  Summary:   1. Left ventricular ejection fraction, by visual estimation, is 25 to   30%.   2. Severely decreased global left ventricular systolic function.   3. Multiple left ventricular regional wall motion abnormalities exist.   See wall motion findings.   4. Elevated left atrial and left ventricular end-diastolic pressures.   5. Mildly increased left ventricular internal cavity size.   6. Spectral Doppler shows restrictive pattern of left ventricular   myocardial filling (Grade III diastolic dysfunction).   7. Moderately enlarged right ventricle.   8. Moderately reduced RV systolic function.   9. Moderately enlarged right atrium.  10. Moderate to severe mitral valve regurgitation.  11. LA volume Index is 50.0 ml/m² ml/m2.    < end of copied text >

## 2019-10-28 NOTE — PROGRESS NOTE ADULT - ASSESSMENT
55 y/o M w/ PMHx morbid obesity, afib/flutter in the past s/p ablation has not been on blood thinners since ablation p/w SOB for 3 weeks. Patient also states that for the last 3 weeks he has had increased BUCKLEY.    1.	A-Fib/Flutter with RVR  2.	Ac. HFrEF  3.	CM (EF 25-30%)  4.	Morbid obesity  5.	GONZALEZ on CKD-3         PLAN:    ·	Planning CV in OR likely tomorrow. EP F/U  ·	Cont Eliquis  ·	Start tapering diltiazem. Increase Metoprolol to 50 mg po q 8h. Hold for SBP <90  ·	Change Lasix to 40 mg po daily  ·	Check i's and o's and daily wt  ·	Low salt diet and water restriction to 1.5 L/D  ·	Will not start ACE-I until HR is controlled.   ·	ECHO reviewed. EF 25-30%. Multiple wall motion abnormalities.   ·	Monitor renal function  ·	Dietary eval 53 y/o M w/ PMHx morbid obesity, afib/flutter in the past s/p ablation has not been on blood thinners since ablation p/w SOB for 3 weeks. Patient also states that for the last 3 weeks he has had increased BUCKLEY.    1.	A-Fib/Flutter with RVR  2.	Ac. HFrEF  3.	CM (EF 25-30%)  4.	Morbid obesity  5.	GONZALEZ on CKD-3         PLAN:    ·	Planning CV in OR likely tomorrow. EP F/U  ·	Cont Eliquis  ·	Care D/W the EP. Recommended to cont Cardizem drip 10 mg/hr and Metoprolol 25 mg po q 8h.   ·	Change Lasix to 40 mg po daily  ·	Check i's and o's and daily wt  ·	Low salt diet and water restriction to 1.5 L/D  ·	Will not start ACE-I until HR is controlled.   ·	ECHO reviewed. EF 25-30%. Multiple wall motion abnormalities.   ·	Monitor renal function  ·	Dietary eval

## 2019-10-28 NOTE — PROGRESS NOTE ADULT - ASSESSMENT
53 y/o M w/ PMHx morbid obesity, typical AFL s/p typical  ablation (not AF) has not been on blood thinners since ablation p/w SOB for 3 weeks. Was found in AF RVR  New onset dilated cardiomyopathy EF 25-30%    con't tele  stop Diltiazem drip, increase Metoprolol for rate control  BO/DCCV tomorrow in OR  Cardiology consult for ischemic work up, new CMP  start heart failure regiment    Wll d/w Dr Redd 53 y/o M w/ PMHx morbid obesity, typical AFL s/p typical  ablation (not AF) has not been on blood thinners since ablation p/w SOB for 3 weeks. Was found in AF RVR  New onset dilated cardiomyopathy EF 25-30%    con't tele  stop Diltiazem drip, increase Metoprolol for rate control  BO/DCCV tomorrow in OR  LV dysfunction likely due to tachy CM can not r/o CAD. will consider CTA  start heart failure regiment

## 2019-10-28 NOTE — PROGRESS NOTE ADULT - ASSESSMENT
53 y/o M w/ PMHx morbid obesity, afib/flutter in the past s/p ablation has not been on blood thinners since ablation p/w SOB for 3 weeks    # Afib RVR  - s/p 20mg IV cardizem and now on Cardizem drip on max 15mg  - 25mg metoprolol  - TSH 1.99  - apixaban 5 mg BID  - Was supposed to go for cardioversion today. Procedure cancelled because OR table is needed for the procedure (BMI 76). Will go procedure tomorrow. Will keep NPO after midnight    # SOB  - elevated D-dimer 786 on presentation  - due to patients body habitus cannot go for CT angio  - lower ext duplex no evidence of DVT  - ECHO showed EF 25 - 30 % with G III DD    #GONZALEZ  - creatinine 2.1 on presentation  - no known history of CKD  - now 1.8  - prerenal vs cardiorenal?  - will monitor     #Leukocytosis  - possibly secondary to lower ext cellulitis  - patient has chronic lower ext swelling - more recently more erythema and now weeping  - currently afebrile    #Diet: Regular  #Activity: ambulate with assistance  #Dispo: Pending cardioversion by EP 55 y/o M w/ PMHx morbid obesity, afib/flutter in the past s/p ablation has not been on blood thinners since ablation p/w SOB for 3 weeks    # Afib RVR  - s/p 20mg IV cardizem and now on Cardizem drip on max 15mg  - 25mg metoprolol  - TSH 1.99  - apixaban 5 mg BID  - Was supposed to go for cardioversion today. Procedure cancelled because OR table is needed for the procedure (BMI 76). Will go procedure tomorrow. Will keep NPO after midnight    # SOB  - elevated D-dimer 786 on presentation  - due to patients body habitus cannot go for CT angio  - lower ext duplex no evidence of DVT  - ECHO showed EF 25 - 30 % with G III DD    #GONZALEZ  - creatinine 2.1 on presentation  - no known history of CKD  - now 1.8  - prerenal vs cardiorenal?  - will monitor     #Leukocytosis  - possibly secondary to lower ext cellulitis  - patient has chronic lower ext swelling - more recently more erythema and now weeping  - currently afebrile    # MARTIN/OHS?  - needs sleep study as an outpatient     # Bariatric surgery evaluation  - Patient considering bariatric surgery as outpatient     #Diet: Regular  #Activity: ambulate with assistance  #Dispo: Pending cardioversion by EP

## 2019-10-28 NOTE — PROGRESS NOTE ADULT - SUBJECTIVE AND OBJECTIVE BOX
SUBJECTIVE:    Patient is a 54y old Male who presents with a chief complaint of shortness of breath    Currently admitted to medicine with the primary diagnosis of Atrial fibrillation with RVR     Today is hospital day 2d. This morning he is resting comfortably in bed and reports no new issues or overnight events. He says he is no more feeling palpitations or SOB. He is aware that he is going for the procedure today.    PAST MEDICAL & SURGICAL HISTORY  Atrial fibrillation and flutter  History of prostate surgery  S/P ablation of atrial fibrillation  H/O skin graft      ALLERGIES:  Augmentin (Other)  clindamycin (Other)  penicillins (Other)    MEDICATIONS:  STANDING MEDICATIONS  apixaban 5 milliGRAM(s) Oral every 12 hours  diltiazem Infusion 15 mG/Hr IV Continuous <Continuous>  furosemide   Injectable 40 milliGRAM(s) IV Push daily  metoprolol tartrate 25 milliGRAM(s) Oral every 8 hours  senna 1 Tablet(s) Oral daily    PRN MEDICATIONS  bisacodyl 5 milliGRAM(s) Oral every 12 hours PRN    VITALS:   T(F): 98  HR: 77  BP: 149/91  RR: 18  SpO2: 97%    LABS:                        12.6   11.04 )-----------( 190      ( 28 Oct 2019 05:46 )             41.0     10-27    140  |  104  |  49<H>  ----------------------------<  121<H>  4.6   |  22  |  1.8<H>    Ca    8.8      27 Oct 2019 05:50      PTT - ( 27 Oct 2019 05:50 )  PTT:28.8 sec          CARDIAC MARKERS ( 26 Oct 2019 11:55 )  x     / <0.01 ng/mL / x     / x     / x          RADIOLOGY:  < from: Transthoracic Echocardiogram (10.27.19 @ 15:56) >  Summary:   1. Left ventricular ejection fraction, by visual estimation, is 25 to   30%.   2. Severely decreased global left ventricular systolic function.   3. Multiple left ventricular regional wall motion abnormalities exist.   See wall motion findings.   4. Elevated left atrial and left ventricular end-diastolic pressures.   5. Mildly increased left ventricular internal cavity size.   6. Spectral Doppler shows restrictive pattern of left ventricular   myocardial filling (Grade III diastolic dysfunction).   7. Moderately enlarged right ventricle.   8. Moderately reduced RV systolic function.   9. Moderately enlarged right atrium.  10. Moderate to severe mitral valve regurgitation.  11. LA volume Index is 50.0 ml/m² ml/m2.    < end of copied text >        PHYSICAL EXAM:  GEN: No acute distress  LUNGS: Clear to auscultation bilaterally   HEART: regilar  ABD: Soft  EXT: b/l LE redness and swelling  NEURO: AAOX3

## 2019-10-29 LAB
ALBUMIN SERPL ELPH-MCNC: 3.5 G/DL — SIGNIFICANT CHANGE UP (ref 3.5–5.2)
ALP SERPL-CCNC: 77 U/L — SIGNIFICANT CHANGE UP (ref 30–115)
ALT FLD-CCNC: 50 U/L — HIGH (ref 0–41)
ANION GAP SERPL CALC-SCNC: 16 MMOL/L — HIGH (ref 7–14)
AST SERPL-CCNC: 35 U/L — SIGNIFICANT CHANGE UP (ref 0–41)
BILIRUB SERPL-MCNC: 1 MG/DL — SIGNIFICANT CHANGE UP (ref 0.2–1.2)
BUN SERPL-MCNC: 38 MG/DL — HIGH (ref 10–20)
CALCIUM SERPL-MCNC: 8.6 MG/DL — SIGNIFICANT CHANGE UP (ref 8.5–10.1)
CHLORIDE SERPL-SCNC: 103 MMOL/L — SIGNIFICANT CHANGE UP (ref 98–110)
CO2 SERPL-SCNC: 25 MMOL/L — SIGNIFICANT CHANGE UP (ref 17–32)
CREAT SERPL-MCNC: 1.5 MG/DL — SIGNIFICANT CHANGE UP (ref 0.7–1.5)
GLUCOSE SERPL-MCNC: 104 MG/DL — HIGH (ref 70–99)
HCT VFR BLD CALC: 43.3 % — SIGNIFICANT CHANGE UP (ref 42–52)
HGB BLD-MCNC: 13.3 G/DL — LOW (ref 14–18)
INR BLD: 1.4 RATIO — HIGH (ref 0.65–1.3)
MAGNESIUM SERPL-MCNC: 1.9 MG/DL — SIGNIFICANT CHANGE UP (ref 1.8–2.4)
MCHC RBC-ENTMCNC: 28 PG — SIGNIFICANT CHANGE UP (ref 27–31)
MCHC RBC-ENTMCNC: 30.7 G/DL — LOW (ref 32–37)
MCV RBC AUTO: 91.2 FL — SIGNIFICANT CHANGE UP (ref 80–94)
NRBC # BLD: 0 /100 WBCS — SIGNIFICANT CHANGE UP (ref 0–0)
PHOSPHATE SERPL-MCNC: 4.5 MG/DL — SIGNIFICANT CHANGE UP (ref 2.1–4.9)
PLATELET # BLD AUTO: 195 K/UL — SIGNIFICANT CHANGE UP (ref 130–400)
POTASSIUM SERPL-MCNC: 3.9 MMOL/L — SIGNIFICANT CHANGE UP (ref 3.5–5)
POTASSIUM SERPL-SCNC: 3.9 MMOL/L — SIGNIFICANT CHANGE UP (ref 3.5–5)
PROT SERPL-MCNC: 6.1 G/DL — SIGNIFICANT CHANGE UP (ref 6–8)
PROTHROM AB SERPL-ACNC: 16.1 SEC — HIGH (ref 9.95–12.87)
RBC # BLD: 4.75 M/UL — SIGNIFICANT CHANGE UP (ref 4.7–6.1)
RBC # FLD: 16.3 % — HIGH (ref 11.5–14.5)
SODIUM SERPL-SCNC: 144 MMOL/L — SIGNIFICANT CHANGE UP (ref 135–146)
WBC # BLD: 9.84 K/UL — SIGNIFICANT CHANGE UP (ref 4.8–10.8)
WBC # FLD AUTO: 9.84 K/UL — SIGNIFICANT CHANGE UP (ref 4.8–10.8)

## 2019-10-29 PROCEDURE — 93320 DOPPLER ECHO COMPLETE: CPT | Mod: 26

## 2019-10-29 PROCEDURE — 93325 DOPPLER ECHO COLOR FLOW MAPG: CPT | Mod: 26

## 2019-10-29 PROCEDURE — 93318 ECHO TRANSESOPHAGEAL INTRAOP: CPT | Mod: 26,59

## 2019-10-29 PROCEDURE — 99233 SBSQ HOSP IP/OBS HIGH 50: CPT

## 2019-10-29 PROCEDURE — 93312 ECHO TRANSESOPHAGEAL: CPT | Mod: 26

## 2019-10-29 PROCEDURE — 71045 X-RAY EXAM CHEST 1 VIEW: CPT | Mod: 26

## 2019-10-29 RX ORDER — METOPROLOL TARTRATE 50 MG
5 TABLET ORAL ONCE
Refills: 0 | Status: COMPLETED | OUTPATIENT
Start: 2019-10-29 | End: 2019-10-29

## 2019-10-29 RX ORDER — ONDANSETRON 8 MG/1
4 TABLET, FILM COATED ORAL ONCE
Refills: 0 | Status: DISCONTINUED | OUTPATIENT
Start: 2019-10-29 | End: 2019-11-06

## 2019-10-29 RX ORDER — FUROSEMIDE 40 MG
20 TABLET ORAL ONCE
Refills: 0 | Status: COMPLETED | OUTPATIENT
Start: 2019-10-29 | End: 2019-10-29

## 2019-10-29 RX ORDER — FUROSEMIDE 40 MG
60 TABLET ORAL DAILY
Refills: 0 | Status: DISCONTINUED | OUTPATIENT
Start: 2019-10-30 | End: 2019-10-31

## 2019-10-29 RX ORDER — METOPROLOL TARTRATE 50 MG
50 TABLET ORAL
Refills: 0 | Status: DISCONTINUED | OUTPATIENT
Start: 2019-10-29 | End: 2019-10-30

## 2019-10-29 RX ORDER — FUROSEMIDE 40 MG
40 TABLET ORAL ONCE
Refills: 0 | Status: COMPLETED | OUTPATIENT
Start: 2019-10-29 | End: 2019-10-29

## 2019-10-29 RX ADMIN — Medication 20 MILLIGRAM(S): at 10:38

## 2019-10-29 RX ADMIN — Medication 15 MG/HR: at 18:58

## 2019-10-29 RX ADMIN — Medication 50 MILLIGRAM(S): at 17:24

## 2019-10-29 RX ADMIN — Medication 5 MILLIGRAM(S): at 15:08

## 2019-10-29 RX ADMIN — Medication 40 MILLIGRAM(S): at 06:24

## 2019-10-29 RX ADMIN — Medication 15 MG/HR: at 10:39

## 2019-10-29 RX ADMIN — Medication 25 MILLIGRAM(S): at 06:24

## 2019-10-29 RX ADMIN — APIXABAN 5 MILLIGRAM(S): 2.5 TABLET, FILM COATED ORAL at 06:24

## 2019-10-29 RX ADMIN — Medication 15 MG/HR: at 10:37

## 2019-10-29 RX ADMIN — SENNA PLUS 1 TABLET(S): 8.6 TABLET ORAL at 17:23

## 2019-10-29 RX ADMIN — APIXABAN 5 MILLIGRAM(S): 2.5 TABLET, FILM COATED ORAL at 17:24

## 2019-10-29 NOTE — PROGRESS NOTE ADULT - SUBJECTIVE AND OBJECTIVE BOX
Electrophysiology Brief Post-Op Note      PRE-OP DIAGNOSIS: AF    POST-OP DIAGNOSIS: AF with JERALD thrombus    PROCEDURE: BO    Physician: Ivania  Assistant: None    ANESTHESIA TYPE:  [  ]General Anesthesia  [ X ] Sedation  [  ] Local/Regional    ESTIMATED BLOOD LOSS:    0   mL    CONDITION  [  ] Critical  [  ] Serious  [  ]Fair  [ X ]Good      SPECIMENS REMOVED (IF APPLICABLE):  none    IMPLANTS (IF APPLICABLE)  nonbe    FINDINGS  PLAN OF CARE  -	cont eliqust  -        cont  cardiozme  -        metoprolol 25 P q8

## 2019-10-29 NOTE — PROGRESS NOTE ADULT - SUBJECTIVE AND OBJECTIVE BOX
ADAL FRENCH 54y Male  MRN#: [id number]     SUBJECTIVE  Patient is a 54y old Male who presents with a chief complaint of   Today is hospital day 3d, and this morning he is lying in bed without distress.   No acute overnight events.   feels well  no chest pain no SOB  no abdominal pain  wants to get the scrotal edema down    OBJECTIVE  PAST MEDICAL & SURGICAL HISTORY  Atrial fibrillation and flutter  History of prostate surgery  S/P ablation of atrial fibrillation  H/O skin graft    ALLERGIES:  Augmentin (Other)  clindamycin (Other)  penicillins (Other)    MEDICATIONS:  STANDING MEDICATIONS  apixaban 5 milliGRAM(s) Oral every 12 hours  diltiazem Infusion 15 mG/Hr IV Continuous <Continuous>  furosemide   Injectable 40 milliGRAM(s) IV Push daily  metoprolol tartrate 25 milliGRAM(s) Oral every 8 hours  senna 1 Tablet(s) Oral daily    PRN MEDICATIONS  bisacodyl 5 milliGRAM(s) Oral every 12 hours PRN    HOME MEDICATIONS  Home Medications:      VITAL SIGNS: Last 24 Hours  T(C): 35.9 (29 Oct 2019 07:53), Max: 36.8 (29 Oct 2019 04:00)  T(F): 96.7 (29 Oct 2019 07:53), Max: 98.2 (29 Oct 2019 04:00)  HR: 69 (29 Oct 2019 07:53) (69 - 121)  BP: 155/88 (29 Oct 2019 07:53) (128/90 - 168/86)  BP(mean): --  RR: 18 (29 Oct 2019 07:53) (18 - 22)  SpO2: 96% (29 Oct 2019 07:39) (96% - 98%)    10-28-19 @ 07:01  -  10-29-19 @ 07:00  --------------------------------------------------------  IN: 330 mL / OUT: 3900 mL / NET: -3570 mL    10-29-19 @ 07:01  -  10-29-19 @ 08:39  --------------------------------------------------------  IN: 15 mL / OUT: 0 mL / NET: 15 mL        LABS:                        13.3   9.84  )-----------( 195      ( 29 Oct 2019 05:55 )             43.3     10-29    144  |  103  |  38<H>  ----------------------------<  104<H>  3.9   |  25  |  1.5    Ca    8.6      29 Oct 2019 05:55  Phos  4.5     10-29  Mg     1.9     10-29    TPro  6.1  /  Alb  3.5  /  TBili  1.0  /  DBili  x   /  AST  35  /  ALT  50<H>  /  AlkPhos  77  10-29    LIVER FUNCTIONS - ( 29 Oct 2019 05:55 )  Alb: 3.5 g/dL / Pro: 6.1 g/dL / ALK PHOS: 77 U/L / ALT: 50 U/L / AST: 35 U/L / GGT: x           PT/INR - ( 29 Oct 2019 05:55 )   PT: 16.10 sec;   INR: 1.40 ratio                       CAPILLARY BLOOD GLUCOSE          RADIOLOGY:    PHYSICAL EXAM:  PHYSICAL EXAM:  GENERAL: NAD, AAO x 4, 54y M, obese  CHEST/LUNG: crackles at bilateral lung bases  HEART: irregular  ABDOMEN: Soft, Nontender, Nondistended; Bowel sounds present; No guarding  Large scrotal edema present  EXTREMITIES:  2+  edema bilateral LE  NEUROLOGY: non-focal    ADMISSION SUMMARY  Patient is a 54y old Male who presents with a chief complaint of

## 2019-10-29 NOTE — PROGRESS NOTE ADULT - SUBJECTIVE AND OBJECTIVE BOX
ADAL FRENCH  54y Male    CHIEF COMPLAINT:    Patient is a 54y old  Male who presents with a chief complaint of sob.     INTERVAL HPI/OVERNIGHT EVENTS:    Patient seen and examined. Scheduled for CV. No palpitations. No sob.     ROS: All other systems are negative.    Vital Signs:    T(F): 96.9 (10-29-19 @ 11:47), Max: 98.2 (10-29-19 @ 04:00)  HR: 94 (10-29-19 @ 11:47) (69 - 121)  BP: 131/92 (10-29-19 @ 11:47) (128/90 - 168/86)  RR: 18 (10-29-19 @ 11:47) (18 - 22)  SpO2: 96% (10-29-19 @ 07:39) (96% - 98%)  I&O's Summary    28 Oct 2019 07:  -  29 Oct 2019 07:00  --------------------------------------------------------  IN: 330 mL / OUT: 3900 mL / NET: -3570 mL    29 Oct 2019 07:  -  29 Oct 2019 12:09  --------------------------------------------------------  IN: 60 mL / OUT: 0 mL / NET: 60 mL      Daily     Daily Weight in k (29 Oct 2019 04:00)  CAPILLARY BLOOD GLUCOSE          PHYSICAL EXAM:    GENERAL:  NAD  SKIN: No rashes or lesions  HENT: Atraumatic Normocephalic. PERRL. Moist membranes.  NECK: Supple, No JVD. No lymphadenopathy.  PULMONARY: CTA B/L. No wheezing. No rales  CVS: Normal S1, S2. Rate and Rhythm are IRIR. No murmurs.  ABDOMEN/GI: Soft, Nontender, Nondistended; BS present  EXTREMITIES: Peripheral pulses intact. 2+ pitting edema B/L LE.  NEUROLOGIC:  No motor or sensory deficit.  PSYCH: Alert & oriented x 3    Consultant(s) Notes Reviewed:  [x ] YES  [ ] NO  Care Discussed with Consultants/Other Providers [ x] YES  [ ] NO    EKG reviewed  Telemetry reviewed    LABS:                        13.3   9.84  )-----------( 195      ( 29 Oct 2019 05:55 )             43.3     10-29    144  |  103  |  38<H>  ----------------------------<  104<H>  Creatinine Trend: 1.5<--, 1.8<--, 1.7<--, 2.1<--  3.9   |  25  |  1.5    Ca    8.6      29 Oct 2019 05:55  Phos  4.5     10-29  Mg     1.9     10-29    TPro  6.1  /  Alb  3.5  /  TBili  1.0  /  DBili  x   /  AST  35  /  ALT  50<H>  /  AlkPhos  77  10-29    PT/INR - ( 29 Oct 2019 05:55 )   PT: 16.10 sec;   INR: 1.40 ratio           Serum Pro-Brain Natriuretic Peptide: 3531 pg/mL (10-26-19 @ 00:00)          RADIOLOGY & ADDITIONAL TESTS:      Imaging or report Personally Reviewed:  [ ] YES  [ ] NO    Medications:  Standing  apixaban 5 milliGRAM(s) Oral every 12 hours  diltiazem Infusion 15 mG/Hr IV Continuous <Continuous>  metoprolol tartrate 25 milliGRAM(s) Oral every 8 hours  senna 1 Tablet(s) Oral daily    PRN Meds  bisacodyl 5 milliGRAM(s) Oral every 12 hours PRN      Case discussed with resident    Care discussed with pt/family

## 2019-10-29 NOTE — PROGRESS NOTE ADULT - ASSESSMENT
53 y/o M w/ PMHx morbid obesity, afib/flutter in the past s/p ablation has not been on blood thinners since ablation p/w SOB for 3 weeks. Patient also states that for the last 3 weeks he has had increased BUCKLEY.    1.	A-Fib/Flutter with RVR  2.	Ac. HFrEF  3.	CM (EF 25-30%)  4.	Morbid obesity  5.	GONZALEZ on CKD-3         PLAN:    ·	CV in OR today  ·	Cont Eliquis  ·	Care D/W the EP. Recommended to cont Cardizem drip 10 mg/hr and Metoprolol 25 mg po q 8h.   ·	Change Lasix to 60 mg po daily. Give lasix 40 mg ivp x 1 now. Add Metolazone 2.5 mg po q 12h  ·	Check i's and o's and daily wt  ·	Low salt diet and water restriction to 1.5 L/D  ·	Will hold on starting ACE-I due to GONZALEZ   ·	ECHO reviewed. EF 25-30%. Multiple wall motion abnormalities.   ·	Monitor renal function  ·	Dietary eval 55 y/o M w/ PMHx morbid obesity, afib/flutter in the past s/p ablation has not been on blood thinners since ablation p/w SOB for 3 weeks. Patient also states that for the last 3 weeks he has had increased BUCKLEY.    1.	A-Fib/Flutter with RVR  2.	Ac. HFrEF  3.	CM (EF 25-30%)  4.	Morbid obesity  5.	GONZALEZ on CKD-3         PLAN:    ·	CV in OR today  ·	Cont Eliquis  ·	Care D/W the EP. Recommended to cont Cardizem drip 10 mg/hr and Metoprolol 25 mg po q 8h.   ·	Change Lasix to 60 mg po daily. Give lasix 40 mg ivp x 1 now. Add Metolazone 2.5 mg po daily  ·	Check i's and o's and daily wt  ·	Low salt diet and water restriction to 1.5 L/D  ·	Will hold on starting ACE-I due to GONZALEZ   ·	ECHO reviewed. EF 25-30%. Multiple wall motion abnormalities.   ·	Monitor renal function  ·	Dietary eval

## 2019-10-29 NOTE — PROGRESS NOTE ADULT - ASSESSMENT
53 y/o M w/ PMHx morbid obesity, afib/flutter in the past s/p ablation has not been on blood thinners since ablation p/w SOB for 3 weeks    # Afib RVR  - on Cardizem drip on max 15mg  - 25mg metoprolol  - TSH 1.99  - apixaban 5 mg BID  - Was supposed to go for cardioversion today. Procedure cancelled because OR table is needed for the procedure (BMI 76). Will go procedure tomorrow. Will keep NPO after midnight    # SOB  - elevated D-dimer 786 on presentation  - due to patients body habitus cannot go for CT angio  - lower ext duplex no evidence of DVT  - ECHO showed EF 25 - 30 % with G III DD    #GONZALEZ  - creatinine 2.1 on presentation  - no known history of CKD  - now 1.8  - prerenal vs cardiorenal?  - will monitor     #Leukocytosis  - possibly secondary to lower ext cellulitis  - patient has chronic lower ext swelling - more recently more erythema and now weeping  - currently afebrile    # MARTIN/OHS?  - needs sleep study as an outpatient     # Bariatric surgery evaluation  - Patient considering bariatric surgery as outpatient     #Diet: Regular  #Activity: ambulate with assistance  #Dispo: Pending cardioversion by EP 55 y/o M w/ PMHx morbid obesity, afib/flutter in the past s/p ablation has not been on blood thinners since ablation p/w SOB for 3 weeks    # Afib RVR  - on Cardizem drip on max 15mg, will change to PO after cardioversion or d/c  - 25mg metoprolol  - TSH 1.99  - apixaban 5 mg BID  - Was supposed to go for cardioversion today. Procedure cancelled because OR table is needed for the procedure (BMI 76). Will go procedure tomorrow. Will keep NPO after midnight    # SOB  - elevated D-dimer 786 on presentation  - due to patients body habitus cannot go for CT angio  - lower ext duplex no evidence of DVT  - ECHO showed EF 25 - 30 % with G III DD    #GONZALEZ  - creatinine 2.1 on presentation  - no known history of CKD  - now 1.8  - prerenal vs cardiorenal?  - will monitor     #Leukocytosis  - possibly secondary to lower ext cellulitis  - patient has chronic lower ext swelling - more recently more erythema and now weeping  - currently afebrile    # MARTIN/OHS?  - needs sleep study as an outpatient     #Morbid obesity  -BMI 82.5  - Bariatric surgery evaluation  - Patient considering bariatric surgery as outpatient     #Diet: Regular  #Activity: ambulate with assistance  #Dispo: Pending cardioversion by EP

## 2019-10-29 NOTE — PRE-ANESTHESIA EVALUATION ADULT - NSANTHOSAYNRD_GEN_A_CORE
No. MARTIN screening performed.  STOP BANG Legend: 0-2 = LOW Risk; 3-4 = INTERMEDIATE Risk; 5-8 = HIGH Risk
No. MARTIN screening performed.  STOP BANG Legend: 0-2 = LOW Risk; 3-4 = INTERMEDIATE Risk; 5-8 = HIGH Risk

## 2019-10-30 LAB
ANION GAP SERPL CALC-SCNC: 15 MMOL/L — HIGH (ref 7–14)
BASOPHILS # BLD AUTO: 0.07 K/UL — SIGNIFICANT CHANGE UP (ref 0–0.2)
BASOPHILS NFR BLD AUTO: 0.8 % — SIGNIFICANT CHANGE UP (ref 0–1)
BUN SERPL-MCNC: 31 MG/DL — HIGH (ref 10–20)
CALCIUM SERPL-MCNC: 8.6 MG/DL — SIGNIFICANT CHANGE UP (ref 8.5–10.1)
CHLORIDE SERPL-SCNC: 101 MMOL/L — SIGNIFICANT CHANGE UP (ref 98–110)
CO2 SERPL-SCNC: 24 MMOL/L — SIGNIFICANT CHANGE UP (ref 17–32)
CREAT SERPL-MCNC: 1.4 MG/DL — SIGNIFICANT CHANGE UP (ref 0.7–1.5)
EOSINOPHIL # BLD AUTO: 0.25 K/UL — SIGNIFICANT CHANGE UP (ref 0–0.7)
EOSINOPHIL NFR BLD AUTO: 2.7 % — SIGNIFICANT CHANGE UP (ref 0–8)
GLUCOSE SERPL-MCNC: 105 MG/DL — HIGH (ref 70–99)
HCT VFR BLD CALC: 41.7 % — LOW (ref 42–52)
HGB BLD-MCNC: 13 G/DL — LOW (ref 14–18)
IMM GRANULOCYTES NFR BLD AUTO: 0.6 % — HIGH (ref 0.1–0.3)
LYMPHOCYTES # BLD AUTO: 1.48 K/UL — SIGNIFICANT CHANGE UP (ref 1.2–3.4)
LYMPHOCYTES # BLD AUTO: 16 % — LOW (ref 20.5–51.1)
MAGNESIUM SERPL-MCNC: 1.7 MG/DL — LOW (ref 1.8–2.4)
MCHC RBC-ENTMCNC: 28.1 PG — SIGNIFICANT CHANGE UP (ref 27–31)
MCHC RBC-ENTMCNC: 31.2 G/DL — LOW (ref 32–37)
MCV RBC AUTO: 90.1 FL — SIGNIFICANT CHANGE UP (ref 80–94)
MONOCYTES # BLD AUTO: 1.04 K/UL — HIGH (ref 0.1–0.6)
MONOCYTES NFR BLD AUTO: 11.3 % — HIGH (ref 1.7–9.3)
NEUTROPHILS # BLD AUTO: 6.34 K/UL — SIGNIFICANT CHANGE UP (ref 1.4–6.5)
NEUTROPHILS NFR BLD AUTO: 68.6 % — SIGNIFICANT CHANGE UP (ref 42.2–75.2)
NRBC # BLD: 0 /100 WBCS — SIGNIFICANT CHANGE UP (ref 0–0)
PLATELET # BLD AUTO: 185 K/UL — SIGNIFICANT CHANGE UP (ref 130–400)
POTASSIUM SERPL-MCNC: 4.1 MMOL/L — SIGNIFICANT CHANGE UP (ref 3.5–5)
POTASSIUM SERPL-SCNC: 4.1 MMOL/L — SIGNIFICANT CHANGE UP (ref 3.5–5)
RBC # BLD: 4.63 M/UL — LOW (ref 4.7–6.1)
RBC # FLD: 16.2 % — HIGH (ref 11.5–14.5)
SODIUM SERPL-SCNC: 140 MMOL/L — SIGNIFICANT CHANGE UP (ref 135–146)
WBC # BLD: 9.24 K/UL — SIGNIFICANT CHANGE UP (ref 4.8–10.8)
WBC # FLD AUTO: 9.24 K/UL — SIGNIFICANT CHANGE UP (ref 4.8–10.8)

## 2019-10-30 PROCEDURE — 99233 SBSQ HOSP IP/OBS HIGH 50: CPT

## 2019-10-30 RX ORDER — DILTIAZEM HCL 120 MG
90 CAPSULE, EXT RELEASE 24 HR ORAL ONCE
Refills: 0 | Status: COMPLETED | OUTPATIENT
Start: 2019-10-30 | End: 2019-10-30

## 2019-10-30 RX ORDER — METOPROLOL TARTRATE 50 MG
25 TABLET ORAL EVERY 8 HOURS
Refills: 0 | Status: DISCONTINUED | OUTPATIENT
Start: 2019-10-30 | End: 2019-10-30

## 2019-10-30 RX ORDER — MAGNESIUM SULFATE 500 MG/ML
2 VIAL (ML) INJECTION ONCE
Refills: 0 | Status: COMPLETED | OUTPATIENT
Start: 2019-10-30 | End: 2019-10-30

## 2019-10-30 RX ORDER — METOPROLOL TARTRATE 50 MG
50 TABLET ORAL EVERY 12 HOURS
Refills: 0 | Status: DISCONTINUED | OUTPATIENT
Start: 2019-10-30 | End: 2019-10-31

## 2019-10-30 RX ORDER — DILTIAZEM HCL 120 MG
90 CAPSULE, EXT RELEASE 24 HR ORAL EVERY 6 HOURS
Refills: 0 | Status: DISCONTINUED | OUTPATIENT
Start: 2019-10-30 | End: 2019-10-31

## 2019-10-30 RX ADMIN — SENNA PLUS 1 TABLET(S): 8.6 TABLET ORAL at 11:38

## 2019-10-30 RX ADMIN — Medication 25 MILLIGRAM(S): at 15:51

## 2019-10-30 RX ADMIN — Medication 50 GRAM(S): at 11:37

## 2019-10-30 RX ADMIN — Medication 90 MILLIGRAM(S): at 17:44

## 2019-10-30 RX ADMIN — APIXABAN 5 MILLIGRAM(S): 2.5 TABLET, FILM COATED ORAL at 06:03

## 2019-10-30 RX ADMIN — Medication 90 MILLIGRAM(S): at 11:38

## 2019-10-30 RX ADMIN — Medication 50 MILLIGRAM(S): at 06:02

## 2019-10-30 RX ADMIN — APIXABAN 5 MILLIGRAM(S): 2.5 TABLET, FILM COATED ORAL at 17:45

## 2019-10-30 RX ADMIN — Medication 90 MILLIGRAM(S): at 23:16

## 2019-10-30 RX ADMIN — Medication 50 MILLIGRAM(S): at 17:45

## 2019-10-30 RX ADMIN — Medication 60 MILLIGRAM(S): at 06:03

## 2019-10-30 RX ADMIN — Medication 90 MILLIGRAM(S): at 08:53

## 2019-10-30 NOTE — PROGRESS NOTE ADULT - SUBJECTIVE AND OBJECTIVE BOX
ADAL FRENCH 54y Male  MRN#: [id number]     SUBJECTIVE  Patient is a 54y old Male who presents with a chief complaint of palpitaiton  Today is hospital day 4d, and this morning he is lying in bed without distress.   No acute overnight events.   feels well wants to go home  noc hest pain no siob  no abdominal pain    OBJECTIVE  PAST MEDICAL & SURGICAL HISTORY  Atrial fibrillation and flutter  History of prostate surgery  S/P ablation of atrial fibrillation  H/O skin graft    ALLERGIES:  Augmentin (Other)  clindamycin (Other)  penicillins (Other)    MEDICATIONS:  STANDING MEDICATIONS  apixaban 5 milliGRAM(s) Oral every 12 hours  diltiazem    Tablet 90 milliGRAM(s) Oral every 6 hours  furosemide   Injectable 60 milliGRAM(s) IV Push daily  magnesium sulfate  IVPB 2 Gram(s) IV Intermittent once  metolazone 2.5 milliGRAM(s) Oral daily  metoprolol tartrate 25 milliGRAM(s) Oral every 8 hours  senna 1 Tablet(s) Oral daily    PRN MEDICATIONS  bisacodyl 5 milliGRAM(s) Oral every 12 hours PRN  ondansetron Injectable 4 milliGRAM(s) IV Push once PRN    HOME MEDICATIONS  Home Medications:      VITAL SIGNS: Last 24 Hours  T(C): 35.9 (30 Oct 2019 07:30), Max: 36.1 (29 Oct 2019 11:47)  T(F): 96.6 (30 Oct 2019 07:30), Max: 97 (29 Oct 2019 20:30)  HR: 87 (30 Oct 2019 07:30) (78 - 96)  BP: 135/88 (30 Oct 2019 07:30) (118/57 - 139/79)  BP(mean): --  RR: 18 (30 Oct 2019 07:30) (18 - 18)  SpO2: 97% (30 Oct 2019 01:40) (97% - 98%)    10-29-19 @ 07:01  -  10-30-19 @ 07:00  --------------------------------------------------------  IN: 225 mL / OUT: 2350 mL / NET: -2125 mL    10-30-19 @ 07:01  -  10-30-19 @ 10:40  --------------------------------------------------------  IN: 15 mL / OUT: 0 mL / NET: 15 mL        LABS:                        13.0   9.24  )-----------( 185      ( 30 Oct 2019 06:02 )             41.7     10-30    140  |  101  |  31<H>  ----------------------------<  105<H>  4.1   |  24  |  1.4    Ca    8.6      30 Oct 2019 06:02  Phos  4.5     10-29  Mg     1.7     10-30    TPro  6.1  /  Alb  3.5  /  TBili  1.0  /  DBili  x   /  AST  35  /  ALT  50<H>  /  AlkPhos  77  10-29    LIVER FUNCTIONS - ( 29 Oct 2019 05:55 )  Alb: 3.5 g/dL / Pro: 6.1 g/dL / ALK PHOS: 77 U/L / ALT: 50 U/L / AST: 35 U/L / GGT: x           PT/INR - ( 29 Oct 2019 05:55 )   PT: 16.10 sec;   INR: 1.40 ratio                       CAPILLARY BLOOD GLUCOSE          RADIOLOGY:    PHYSICAL EXAM:  PHYSICAL EXAM:  GENERAL: NAD, AAO x 4, 54y M  CHEST/LUNG: crackles at bases  HEART: irregular  ABDOMEN: Soft, Nontender, Nondistended; Bowel sounds present; No guarding, obese  EXTREMITIES: 3+ PITTING EDEMA B/L lower extremities AND SCORTAL EDEMA

## 2019-10-30 NOTE — PROGRESS NOTE ADULT - SUBJECTIVE AND OBJECTIVE BOX
ADAL FRENCH  54y Male    CHIEF COMPLAINT:    Patient is a 54y old  Male who presents with a chief complaint of     INTERVAL HPI/OVERNIGHT EVENTS:    Patient seen and examined. CV was cancelled as pt was found to have clot in. Remains in A-Fib. No sob. No palpitations.     ROS: All other systems are negative.    Vital Signs:    T(F): 96.6 (10-30-19 @ 07:30), Max: 97 (10-29-19 @ 20:30)  HR: 87 (10-30-19 @ 07:30) (78 - 96)  BP: 135/88 (10-30-19 @ 07:30) (118/57 - 139/79)  RR: 18 (10-30-19 @ 07:30) (18 - 18)  SpO2: 97% (10-30-19 @ 01:40) (97% - 98%)  I&O's Summary    29 Oct 2019 07:  -  30 Oct 2019 07:00  --------------------------------------------------------  IN: 225 mL / OUT: 2350 mL / NET: -2125 mL    30 Oct 2019 07:  -  30 Oct 2019 13:48  --------------------------------------------------------  IN: 30 mL / OUT: 1800 mL / NET: -1770 mL      Daily Height in cm: 177.8 (29 Oct 2019 14:00)    Daily Weight in k (30 Oct 2019 06:36)  CAPILLARY BLOOD GLUCOSE          PHYSICAL EXAM:    GENERAL:  NAD  SKIN: No rashes or lesions  HENT: Atraumatic Normocephalic. PERRL. Moist membranes.  NECK: Supple, No JVD. No lymphadenopathy.  PULMONARY: CTA B/L. No wheezing. No rales  CVS: Normal S1, S2. Rate and Rhythm are IRIR. No murmurs.  ABDOMEN/GI: Soft, Nontender, Nondistended; BS present  EXTREMITIES: Peripheral pulses intact. 1+ pitting edema B/L LE.  NEUROLOGIC:  No motor or sensory deficit.  PSYCH: Alert & oriented x 3    Consultant(s) Notes Reviewed:  [x ] YES  [ ] NO  Care Discussed with Consultants/Other Providers [ x] YES  [ ] NO    EKG reviewed  Telemetry reviewed    LABS:                        13.0   9.24  )-----------( 185      ( 30 Oct 2019 06:02 )             41.7     10-30    140  |  101  |  31<H>  ----------------------------<  105<H>  Creatinine Trend: 1.4<--, 1.5<--, 1.8<--, 1.7<--, 2.1<--  4.1   |  24  |  1.4    Ca    8.6      30 Oct 2019 06:02  Phos  4.5     10-  Mg     1.7     10-30    TPro  6.1  /  Alb  3.5  /  TBili  1.0  /  DBili  x   /  AST  35  /  ALT  50<H>  /  AlkPhos  77  10    PT/INR - ( 29 Oct 2019 05:55 )   PT: 16.10 sec;   INR: 1.40 ratio           Serum Pro-Brain Natriuretic Peptide: 3531 pg/mL (10-26-19 @ 00:00)          RADIOLOGY & ADDITIONAL TESTS:      Imaging or report Personally Reviewed:  [ ] YES  [ ] NO    Medications:  Standing  apixaban 5 milliGRAM(s) Oral every 12 hours  diltiazem    Tablet 90 milliGRAM(s) Oral every 6 hours  furosemide   Injectable 60 milliGRAM(s) IV Push daily  metolazone 2.5 milliGRAM(s) Oral daily  metoprolol tartrate 25 milliGRAM(s) Oral every 8 hours  senna 1 Tablet(s) Oral daily    PRN Meds  bisacodyl 5 milliGRAM(s) Oral every 12 hours PRN  ondansetron Injectable 4 milliGRAM(s) IV Push once PRN      Case discussed with resident    Care discussed with pt/family

## 2019-10-30 NOTE — PROGRESS NOTE ADULT - ASSESSMENT
55 y/o M w/ PMHx morbid obesity, afib/flutter in the past s/p ablation has not been on blood thinners since ablation p/w SOB for 3 weeks. Patient also states that for the last 3 weeks he has had increased BUCKLEY.    1.	A-Fib/Flutter with RVR  2.	Ac. HFrEF  3.	CM (EF 25-30%)  4.	Morbid obesity  5.	GONZALEZ on CKD-3         PLAN:    ·	CV was cancelled yesterday as pt was found to have clot in LA appendage.   ·	EP recommended to cont Eliquis for six weeks and F/U with them as an out pt.   ·	Switched him to Cardizem po 90 mg q 6h   ·	Cont IV lasix and PO Metolazone  ·	Renal function imroving. Follow BMP  ·	Check i's and o's and daily wt  ·	Low salt diet and water restriction to 1.5 L/D  ·	Will hold on starting ACE-I due to GONZALEZ   ·	ECHO reviewed. EF 25-30%. Multiple wall motion abnormalities.   ·	Monitor renal function  ·	Dietary eval

## 2019-10-30 NOTE — PHYSICAL THERAPY INITIAL EVALUATION ADULT - SPECIFY REASON(S)
PT evaluation attempted however, patient refused to participate. Patient requested to do PT eval tomorrow because he is "wiped out". Will follow up.

## 2019-10-30 NOTE — PROGRESS NOTE ADULT - ASSESSMENT
53 y/o M w/ PMHx morbid obesity, afib/flutter in the past s/p ablation has not been on blood thinners since ablation p/w SOB for 3 weeks    # Afib RVR  - cardizem 90 q6  - 25mg metoprolol  - apixaban 5 mg BID  - BO showed LA appendage thrombus, continue AC and follow up in 6 weeks  -f/u EP for optimal rate control meds    # SOB  - elevated D-dimer 786 on presentation  - due to patients body habitus cannot go for CT angio  - lower ext duplex no evidence of DVT  - ECHO showed EF 25 - 30 % with grade 3 diastolic dysfunction     #GONZALEZ likely cardiorenal  - creatinine 2.1 on presentation improving  - no known history of CKD  - prerenal vs cardiorenal?  - will monitor     # MARTIN/OHS?  - needs sleep study as an outpatient     #Morbid obesity  -BMI 82.5  - Bariatric surgery evaluation  - Patient considering bariatric surgery as outpatient     #Diet: Regular  #Activity: ambulate with assistance  #Dispo: Pending clinical improvemetn

## 2019-10-31 LAB
ANION GAP SERPL CALC-SCNC: 10 MMOL/L — SIGNIFICANT CHANGE UP (ref 7–14)
BASOPHILS # BLD AUTO: 0.09 K/UL — SIGNIFICANT CHANGE UP (ref 0–0.2)
BASOPHILS NFR BLD AUTO: 0.9 % — SIGNIFICANT CHANGE UP (ref 0–1)
BUN SERPL-MCNC: 27 MG/DL — HIGH (ref 10–20)
CALCIUM SERPL-MCNC: 8.8 MG/DL — SIGNIFICANT CHANGE UP (ref 8.5–10.1)
CHLORIDE SERPL-SCNC: 99 MMOL/L — SIGNIFICANT CHANGE UP (ref 98–110)
CO2 SERPL-SCNC: 32 MMOL/L — SIGNIFICANT CHANGE UP (ref 17–32)
CREAT SERPL-MCNC: 1.3 MG/DL — SIGNIFICANT CHANGE UP (ref 0.7–1.5)
EOSINOPHIL # BLD AUTO: 0.28 K/UL — SIGNIFICANT CHANGE UP (ref 0–0.7)
EOSINOPHIL NFR BLD AUTO: 2.8 % — SIGNIFICANT CHANGE UP (ref 0–8)
GLUCOSE SERPL-MCNC: 105 MG/DL — HIGH (ref 70–99)
HCT VFR BLD CALC: 44 % — SIGNIFICANT CHANGE UP (ref 42–52)
HGB BLD-MCNC: 13.7 G/DL — LOW (ref 14–18)
IMM GRANULOCYTES NFR BLD AUTO: 0.7 % — HIGH (ref 0.1–0.3)
LYMPHOCYTES # BLD AUTO: 1.41 K/UL — SIGNIFICANT CHANGE UP (ref 1.2–3.4)
LYMPHOCYTES # BLD AUTO: 14 % — LOW (ref 20.5–51.1)
MAGNESIUM SERPL-MCNC: 1.7 MG/DL — LOW (ref 1.8–2.4)
MCHC RBC-ENTMCNC: 27.9 PG — SIGNIFICANT CHANGE UP (ref 27–31)
MCHC RBC-ENTMCNC: 31.1 G/DL — LOW (ref 32–37)
MCV RBC AUTO: 89.6 FL — SIGNIFICANT CHANGE UP (ref 80–94)
MONOCYTES # BLD AUTO: 1.19 K/UL — HIGH (ref 0.1–0.6)
MONOCYTES NFR BLD AUTO: 11.8 % — HIGH (ref 1.7–9.3)
NEUTROPHILS # BLD AUTO: 7.03 K/UL — HIGH (ref 1.4–6.5)
NEUTROPHILS NFR BLD AUTO: 69.8 % — SIGNIFICANT CHANGE UP (ref 42.2–75.2)
NRBC # BLD: 0 /100 WBCS — SIGNIFICANT CHANGE UP (ref 0–0)
PLATELET # BLD AUTO: 200 K/UL — SIGNIFICANT CHANGE UP (ref 130–400)
POTASSIUM SERPL-MCNC: 3.9 MMOL/L — SIGNIFICANT CHANGE UP (ref 3.5–5)
POTASSIUM SERPL-SCNC: 3.9 MMOL/L — SIGNIFICANT CHANGE UP (ref 3.5–5)
RBC # BLD: 4.91 M/UL — SIGNIFICANT CHANGE UP (ref 4.7–6.1)
RBC # FLD: 16.1 % — HIGH (ref 11.5–14.5)
SODIUM SERPL-SCNC: 141 MMOL/L — SIGNIFICANT CHANGE UP (ref 135–146)
WBC # BLD: 10.07 K/UL — SIGNIFICANT CHANGE UP (ref 4.8–10.8)
WBC # FLD AUTO: 10.07 K/UL — SIGNIFICANT CHANGE UP (ref 4.8–10.8)

## 2019-10-31 PROCEDURE — 99233 SBSQ HOSP IP/OBS HIGH 50: CPT

## 2019-10-31 RX ORDER — POTASSIUM CHLORIDE 20 MEQ
20 PACKET (EA) ORAL ONCE
Refills: 0 | Status: COMPLETED | OUTPATIENT
Start: 2019-10-31 | End: 2019-10-31

## 2019-10-31 RX ORDER — METOPROLOL TARTRATE 50 MG
50 TABLET ORAL EVERY 8 HOURS
Refills: 0 | Status: DISCONTINUED | OUTPATIENT
Start: 2019-10-31 | End: 2019-11-01

## 2019-10-31 RX ORDER — MAGNESIUM SULFATE 500 MG/ML
2 VIAL (ML) INJECTION ONCE
Refills: 0 | Status: COMPLETED | OUTPATIENT
Start: 2019-10-31 | End: 2019-10-31

## 2019-10-31 RX ORDER — FUROSEMIDE 40 MG
40 TABLET ORAL DAILY
Refills: 0 | Status: DISCONTINUED | OUTPATIENT
Start: 2019-11-01 | End: 2019-11-06

## 2019-10-31 RX ORDER — DILTIAZEM HCL 120 MG
60 CAPSULE, EXT RELEASE 24 HR ORAL EVERY 6 HOURS
Refills: 0 | Status: DISCONTINUED | OUTPATIENT
Start: 2019-10-31 | End: 2019-11-02

## 2019-10-31 RX ORDER — APIXABAN 2.5 MG/1
1 TABLET, FILM COATED ORAL
Qty: 60 | Refills: 0
Start: 2019-10-31 | End: 2019-11-29

## 2019-10-31 RX ADMIN — Medication 60 MILLIGRAM(S): at 17:42

## 2019-10-31 RX ADMIN — Medication 60 MILLIGRAM(S): at 23:05

## 2019-10-31 RX ADMIN — SENNA PLUS 1 TABLET(S): 8.6 TABLET ORAL at 11:56

## 2019-10-31 RX ADMIN — Medication 50 MILLIGRAM(S): at 23:05

## 2019-10-31 RX ADMIN — APIXABAN 5 MILLIGRAM(S): 2.5 TABLET, FILM COATED ORAL at 06:11

## 2019-10-31 RX ADMIN — Medication 50 MILLIGRAM(S): at 14:30

## 2019-10-31 RX ADMIN — Medication 90 MILLIGRAM(S): at 06:11

## 2019-10-31 RX ADMIN — Medication 60 MILLIGRAM(S): at 06:11

## 2019-10-31 RX ADMIN — Medication 90 MILLIGRAM(S): at 11:56

## 2019-10-31 RX ADMIN — APIXABAN 5 MILLIGRAM(S): 2.5 TABLET, FILM COATED ORAL at 17:42

## 2019-10-31 RX ADMIN — Medication 50 MILLIGRAM(S): at 06:11

## 2019-10-31 RX ADMIN — Medication 33.33 GRAM(S): at 15:58

## 2019-10-31 RX ADMIN — Medication 20 MILLIEQUIVALENT(S): at 14:30

## 2019-10-31 NOTE — PROGRESS NOTE ADULT - ASSESSMENT
53 y/o M w/ PMHx morbid obesity, afib/flutter in the past s/p ablation has not been on blood thinners since ablation p/w SOB for 3 weeks    # Afib RVR  -CV contraindicated secondary to LA thrombus, AC for 6 weeks then follow up  - cardizem 60q6  - metoprolol 50q8  - apixaban 5 mg BID  - BO showed LA appendage thrombus, continue AC and follow up in 6 weeks  -f/u EP for optimal rate control meds    # SOB- likely tchycardia induced CM  - elevated D-dimer 786 on presentation  - due to patients body habitus cannot go for CT angio  - lower ext duplex no evidence of DVT  - ECHO showed EF 25 - 30 % with grade 3 diastolic dysfunction   -rate control    #Left atrial thrombus  -anticoagulation with eliquis    #GNOZALEZ likely cardiorenal  - creatinine 2.1 on presentation improving  - no known history of CKD  - prerenal vs cardiorenal?  - will monitor     # MARTIN/OHS?  - needs sleep study as an outpatient     #Morbid obesity  -BMI 82.5  - Bariatric surgery evaluation  - Patient considering bariatric surgery as outpatient   -dietary recs appreciated    #Diet: Regular  #Activity: ambulate with assistance  #Dispo: Pending clinical improvemetn

## 2019-10-31 NOTE — PROGRESS NOTE ADULT - SUBJECTIVE AND OBJECTIVE BOX
INTERVAL HPI/OVERNIGHT EVENTS:  s/p BO + JERALD clot  Metoprolol increased yesterday to 50mg q8h with good response  HR 80-100s, occasional 120-130 with activities  had episode of NSVT,     MEDICATIONS  (STANDING):  apixaban 5 milliGRAM(s) Oral every 12 hours  diltiazem    Tablet 60 milliGRAM(s) Oral every 6 hours  metolazone 2.5 milliGRAM(s) Oral <User Schedule>  metoprolol tartrate 50 milliGRAM(s) Oral every 8 hours  senna 1 Tablet(s) Oral daily    MEDICATIONS  (PRN):  bisacodyl 5 milliGRAM(s) Oral every 12 hours PRN Constipation  ondansetron Injectable 4 milliGRAM(s) IV Push once PRN Nausea and/or Vomiting      Allergies    Augmentin (Other)  clindamycin (Other)  penicillins (Other)    Intolerances        REVIEW OF SYSTEMS    [x] A ten-point review of systems was otherwise negative except as noted.        Vital Signs Last 24 Hrs  T(C): 35.8 (31 Oct 2019 05:15), Max: 36.5 (30 Oct 2019 20:30)  T(F): 96.5 (31 Oct 2019 05:15), Max: 97.7 (30 Oct 2019 20:30)  HR: 109 (31 Oct 2019 17:48) (79 - 111)  BP: 108/55 (31 Oct 2019 17:48) (108/55 - 136/82)  BP(mean): --  RR: 18 (31 Oct 2019 17:48) (18 - 18)  SpO2: 95% (31 Oct 2019 17:48) (95% - 96%)    10-30-19 @ 07:01  -  10-31-19 @ 07:00  --------------------------------------------------------  IN: 390 mL / OUT: 3800 mL / NET: -3410 mL    10-31-19 @ 07:01  -  10-31-19 @ 18:42  --------------------------------------------------------  IN: 440 mL / OUT: 4000 mL / NET: -3560 mL        PHYSICAL EXAM:    GENERAL: In no apparent distress, well nourished, and hydrated.  HEART: IRRegular rate and rhythm; No murmur; NO rubs, or gallops.  PULMONARY: Clear to auscultation and percussion.  Normal expansion/effort. No rales, wheezing, or rhonchi bilaterally.  ABDOMEN: obeseSoft, Nontender, Nondistended; Bowel sounds present  EXTREMITIES:  Extremities warm, pink, well-perfused, 2+ Peripheral Pulses, No clubbing, cyanosis, 3+ edema  NEUROLOGICAL: alert & oriented x 3, no focal deficits, PERMARV MORRIS    LABS:                        13.7   10.07 )-----------( 200      ( 31 Oct 2019 07:48 )             44.0     10-31    141  |  99  |  27<H>  ----------------------------<  105<H>  3.9   |  32  |  1.3    Ca    8.8      31 Oct 2019 07:48  Mg     1.7     10-31            RADIOLOGY & ADDITIONAL TESTS:

## 2019-10-31 NOTE — DIETITIAN INITIAL EVALUATION ADULT. - ADD RECOMMEND
continue current diet order + fluid restriction, encourage po intake, provide assistance with meals PRN

## 2019-10-31 NOTE — PROGRESS NOTE ADULT - ASSESSMENT
55 y/o M w/ PMHx morbid obesity, typical AFL s/p typical  ablation (not AF) has not been on blood thinners since ablation p/w SOB for 3 weeks. Was found in AF RVR  New onset dilated cardiomyopathy EF 25-30% probably tachycardia induced    con't tele  Continue Metoprolol 50mg q8h and Diltiazem 90mg q6h  convert to long acting prior to discharge  oob to evaluate rate response  Maintain electrolytes K>4.0 Mg >2.0 to avoid ectopy  con't eliquis  f/u as out pt    D/w Dr Redd

## 2019-10-31 NOTE — CHART NOTE - NSCHARTNOTEFT_GEN_A_CORE
Upon Nutritional Assessment by the Registered Dietitian your patient was determined to meet criteria / has evidence of the following diagnosis/diagnoses:          [ ]  Mild Protein Calorie Malnutrition        [ ]  Moderate Protein Calorie Malnutrition        [ ] Severe Protein Calorie Malnutrition        [ ] Unspecified Protein Calorie Malnutrition        [ ] Underweight / BMI <19        [x] Morbid Obesity / BMI > 40      Findings as based on:  •  Comprehensive nutrition assessment and consultation    Height: 70"  Weight: (10/30) 542 lbs  BMI: 77.8    Treatment:  Provided pt with [facility] diet manual and Nutrition Care Manual handouts for weight loss nutrition education. Went over the handouts in detail and answered all questions accordingly.  The following diet has been recommended: continue with regular diet. no supplements necessary.      PROVIDER Section:     By signing this assessment you are acknowledging and agree with the diagnosis/diagnoses assigned by the Registered Dietitian    Comments:

## 2019-10-31 NOTE — DIETITIAN INITIAL EVALUATION ADULT. - OTHER INFO
Pt admitted d/t Afib w RVR. Pt has +2 edema to the L/R legs, knees, ankles and feet. Skin assessment on 10/30 shows R LQ venous ulcer. Pt with hx CKD-3. Per EMR, pt to have bariatric surgery evaluation and pt is considering bariatric surgery as outpatient. Pt reports that he has been eating well during this admission and only what is given with each meal. No additional food was consumed from outside per pt. Pt reports that he has a good appetite. Pt reports that this is the same pta. Pt denies any nausea/abdominal pain. Pt denies any chewing/swallowing difficulty. Pt does not take any vitamins/supplement pta. Pt has NKFA/intolerances. Pt does not have any food preferences r/t culture/Gnosticist. Pt reports that he has been constipated throughout this admission, but reports that he did have a BM today. Pt is on a bowel regimen. Pt reports wt fluctuations d/t fluid and reports that he is unsure of his UBW dry. Pt reports that he is 500+ lbs normally. Pt dosing wt is 574 lbs vs (10/30) 542 lbs. As previously mentions, wt fluctuation likely 2/2 fluid retention/edema. Provided nutrition education for weight loss/maintenance. Handouts from [facility] diet manual and the NCM provided and discussed in detail. Discussed healthy food preparation methods, limiting distractions when eating, only eating until full, consuming smaller more frequent meals throughout the day to maintain satiety and consuming well-balanced meals. Pt asks relevant questions to the presented material and pt verbalizes understand with no further questions at this time.

## 2019-10-31 NOTE — DIETITIAN INITIAL EVALUATION ADULT. - RD TO REMAIN AVAILABLE
yes/INTERVENTION: meals and snacks, nutrition education, coordination of care. ME: RD to monitor diet order, energy intake, body composition, NFPF, adherence

## 2019-10-31 NOTE — PROGRESS NOTE ADULT - SUBJECTIVE AND OBJECTIVE BOX
ADAL FRENCH  54y Male    CHIEF COMPLAINT:    Patient is a 54y old  Male who presents with a chief complaint of     INTERVAL HPI/OVERNIGHT EVENTS:    Patient seen and examined. No sob. Tachycardiac on monitor. No palpitations.     ROS: All other systems are negative.    Vital Signs:    T(F): 96.5 (10-31-19 @ 05:15), Max: 97.7 (10-30-19 @ 20:30)  HR: 111 (10-31-19 @ 11:55) (79 - 117)  BP: 128/74 (10-31-19 @ 05:15) (106/60 - 136/82)  RR: 18 (10-31-19 @ 05:15) (18 - 18)  SpO2: 96% (10-30-19 @ 20:00) (96% - 96%)  I&O's Summary    30 Oct 2019 07:01  -  31 Oct 2019 07:00  --------------------------------------------------------  IN: 390 mL / OUT: 3800 mL / NET: -3410 mL    31 Oct 2019 07:01  -  31 Oct 2019 12:42  --------------------------------------------------------  IN: 440 mL / OUT: 2000 mL / NET: -1560 mL      Daily     Daily   CAPILLARY BLOOD GLUCOSE          PHYSICAL EXAM:    GENERAL:  NAD  SKIN: No rashes or lesions  HENT: Atraumatic Normocephalic. PERRL. Moist membranes.  NECK: Supple, No JVD. No lymphadenopathy.  PULMONARY: CTA B/L. No wheezing. No rales  CVS: Normal S1, S2. Rate and Rhythm are IRIR. No murmurs.  ABDOMEN/GI: Soft, Nontender, Nondistended; BS present  EXTREMITIES: Peripheral pulses intact. 1+ pitting edema B/L LE. Scrotal swelling  NEUROLOGIC:  No motor or sensory deficit.  PSYCH: Alert & oriented x 3    Consultant(s) Notes Reviewed:  [x ] YES  [ ] NO  Care Discussed with Consultants/Other Providers [ x] YES  [ ] NO    EKG reviewed  Telemetry reviewed    LABS:                        13.7   10.07 )-----------( 200      ( 31 Oct 2019 07:48 )             44.0     10-31    141  |  99  |  27<H>  ----------------------------<  105<H> Creatinine Trend: 1.3<--, 1.4<--, 1.5<--, 1.8<--, 1.7<--, 2.1<--  3.9   |  32  |  1.3    Ca    8.8      31 Oct 2019 07:48  Mg     1.7     10-31        Serum Pro-Brain Natriuretic Peptide: 3531 pg/mL (10-26-19 @ 00:00)          RADIOLOGY & ADDITIONAL TESTS:      Imaging or report Personally Reviewed:  [ ] YES  [ ] NO    Medications:  Standing  apixaban 5 milliGRAM(s) Oral every 12 hours  diltiazem    Tablet 90 milliGRAM(s) Oral every 6 hours  metolazone 2.5 milliGRAM(s) Oral <User Schedule>  metoprolol tartrate 50 milliGRAM(s) Oral every 8 hours  senna 1 Tablet(s) Oral daily    PRN Meds  bisacodyl 5 milliGRAM(s) Oral every 12 hours PRN  ondansetron Injectable 4 milliGRAM(s) IV Push once PRN      Case discussed with resident    Care discussed with pt/family

## 2019-10-31 NOTE — PROGRESS NOTE ADULT - SUBJECTIVE AND OBJECTIVE BOX
ADAL FRENCH 54y Male  MRN#: [id number]     SUBJECTIVE  Patient is a 54y old Male who presents with a chief complaint of   Today is hospital day 5d, and this morning he is lying in bed without distress.   No acute overnight events.   feels better and better.   no chest pain n osb  want to move around  no abdoinal pain    OBJECTIVE  PAST MEDICAL & SURGICAL HISTORY  Atrial fibrillation and flutter  History of prostate surgery  S/P ablation of atrial fibrillation  H/O skin graft    ALLERGIES:  Augmentin (Other)  clindamycin (Other)  penicillins (Other)    MEDICATIONS:  STANDING MEDICATIONS  apixaban 5 milliGRAM(s) Oral every 12 hours  diltiazem    Tablet 90 milliGRAM(s) Oral every 6 hours  magnesium sulfate  IVPB 2 Gram(s) IV Intermittent once  metolazone 2.5 milliGRAM(s) Oral <User Schedule>  metoprolol tartrate 50 milliGRAM(s) Oral every 8 hours  senna 1 Tablet(s) Oral daily    PRN MEDICATIONS  bisacodyl 5 milliGRAM(s) Oral every 12 hours PRN  ondansetron Injectable 4 milliGRAM(s) IV Push once PRN    HOME MEDICATIONS  Home Medications:      VITAL SIGNS: Last 24 Hours  T(C): 35.8 (31 Oct 2019 05:15), Max: 36.5 (30 Oct 2019 20:30)  T(F): 96.5 (31 Oct 2019 05:15), Max: 97.7 (30 Oct 2019 20:30)  HR: 111 (31 Oct 2019 11:55) (79 - 117)  BP: 128/74 (31 Oct 2019 05:15) (106/60 - 136/82)  BP(mean): --  RR: 18 (31 Oct 2019 05:15) (18 - 18)  SpO2: 96% (30 Oct 2019 20:00) (96% - 96%)    10-30-19 @ 07:01  -  10-31-19 @ 07:00  --------------------------------------------------------  IN: 390 mL / OUT: 3800 mL / NET: -3410 mL    10-31-19 @ 07:01  -  10-31-19 @ 14:53  --------------------------------------------------------  IN: 440 mL / OUT: 4000 mL / NET: -3560 mL        LABS:                        13.7   10.07 )-----------( 200      ( 31 Oct 2019 07:48 )             44.0     10-31    141  |  99  |  27<H>  ----------------------------<  105<H>  3.9   |  32  |  1.3    Ca    8.8      31 Oct 2019 07:48  Mg     1.7     10-31                      CAPILLARY BLOOD GLUCOSE          RADIOLOGY:    PHYSICAL EXAM:  PHYSICAL EXAM:  GENERAL: NAD, AAO x 4, 54y M, obese  CHEST/LUNG: CTA bilateral   HEART: irrgular  ABDOMEN: Soft, Nontender, Nondistended; Bowel sounds present; No guarding  EXTREMITIES: large 2+ pitting werner overal improved,   NEUROLOGY: non-focal  large scrotal edema overall improved  ADMISSION SUMMARY  Patient is a 54y old Male who presents with a chief complaint of

## 2019-10-31 NOTE — PROGRESS NOTE ADULT - ASSESSMENT
55 y/o M w/ PMHx morbid obesity, afib/flutter in the past s/p ablation has not been on blood thinners since ablation p/w SOB for 3 weeks. Patient also states that for the last 3 weeks he has had increased BUCKLEY.    1.	A-Fib/Flutter with RVR  2.	Lt. Atrial appendage thrombus.  3.	Ac. HFrEF  4.	CM (EF 25-30%) likely tachycardia induced.   5.	Morbid obesity  6.	GONZALEZ on CKD-3         PLAN:    ·	Tachycardiac. Increase Metoprolol to 50 mg po q 8h. Cont Diltiazem 60 mg po q 6h  ·	CV was cancelled as pt was found to have clot in LA appendage.   ·	EP recommended to cont Eliquis for six weeks and F/U with them as an out pt to reschedule for CV  ·	Switch him to Lasix 40 mg po daily  ·	Renal function improving. Follow BMP  ·	Check i's and o's and daily wt  ·	Low salt diet and water restriction to 1.5 L/D  ·	Will hold on starting ACE-I due to GONZALEZ   ·	ECHO reviewed. EF 25-30%. Multiple wall motion abnormalities.   ·	Dietary eval

## 2019-11-01 LAB
ANION GAP SERPL CALC-SCNC: 11 MMOL/L — SIGNIFICANT CHANGE UP (ref 7–14)
BASOPHILS # BLD AUTO: 0.08 K/UL — SIGNIFICANT CHANGE UP (ref 0–0.2)
BASOPHILS NFR BLD AUTO: 0.8 % — SIGNIFICANT CHANGE UP (ref 0–1)
BUN SERPL-MCNC: 25 MG/DL — HIGH (ref 10–20)
CALCIUM SERPL-MCNC: 8.5 MG/DL — SIGNIFICANT CHANGE UP (ref 8.5–10.1)
CHLORIDE SERPL-SCNC: 96 MMOL/L — LOW (ref 98–110)
CO2 SERPL-SCNC: 35 MMOL/L — HIGH (ref 17–32)
CREAT SERPL-MCNC: 1.4 MG/DL — SIGNIFICANT CHANGE UP (ref 0.7–1.5)
EOSINOPHIL # BLD AUTO: 0.19 K/UL — SIGNIFICANT CHANGE UP (ref 0–0.7)
EOSINOPHIL NFR BLD AUTO: 1.9 % — SIGNIFICANT CHANGE UP (ref 0–8)
GLUCOSE SERPL-MCNC: 105 MG/DL — HIGH (ref 70–99)
HCT VFR BLD CALC: 42.7 % — SIGNIFICANT CHANGE UP (ref 42–52)
HGB BLD-MCNC: 13.2 G/DL — LOW (ref 14–18)
IMM GRANULOCYTES NFR BLD AUTO: 0.3 % — SIGNIFICANT CHANGE UP (ref 0.1–0.3)
LYMPHOCYTES # BLD AUTO: 0.92 K/UL — LOW (ref 1.2–3.4)
LYMPHOCYTES # BLD AUTO: 9.3 % — LOW (ref 20.5–51.1)
MAGNESIUM SERPL-MCNC: 1.7 MG/DL — LOW (ref 1.8–2.4)
MCHC RBC-ENTMCNC: 27.8 PG — SIGNIFICANT CHANGE UP (ref 27–31)
MCHC RBC-ENTMCNC: 30.9 G/DL — LOW (ref 32–37)
MCV RBC AUTO: 89.9 FL — SIGNIFICANT CHANGE UP (ref 80–94)
MONOCYTES # BLD AUTO: 1.1 K/UL — HIGH (ref 0.1–0.6)
MONOCYTES NFR BLD AUTO: 11.1 % — HIGH (ref 1.7–9.3)
NEUTROPHILS # BLD AUTO: 7.56 K/UL — HIGH (ref 1.4–6.5)
NEUTROPHILS NFR BLD AUTO: 76.6 % — HIGH (ref 42.2–75.2)
NRBC # BLD: 0 /100 WBCS — SIGNIFICANT CHANGE UP (ref 0–0)
PLATELET # BLD AUTO: 178 K/UL — SIGNIFICANT CHANGE UP (ref 130–400)
POTASSIUM SERPL-MCNC: 4.2 MMOL/L — SIGNIFICANT CHANGE UP (ref 3.5–5)
POTASSIUM SERPL-SCNC: 4.2 MMOL/L — SIGNIFICANT CHANGE UP (ref 3.5–5)
RBC # BLD: 4.75 M/UL — SIGNIFICANT CHANGE UP (ref 4.7–6.1)
RBC # FLD: 16 % — HIGH (ref 11.5–14.5)
SODIUM SERPL-SCNC: 142 MMOL/L — SIGNIFICANT CHANGE UP (ref 135–146)
WBC # BLD: 9.88 K/UL — SIGNIFICANT CHANGE UP (ref 4.8–10.8)
WBC # FLD AUTO: 9.88 K/UL — SIGNIFICANT CHANGE UP (ref 4.8–10.8)

## 2019-11-01 PROCEDURE — 99233 SBSQ HOSP IP/OBS HIGH 50: CPT

## 2019-11-01 RX ORDER — MAGNESIUM SULFATE 500 MG/ML
2 VIAL (ML) INJECTION EVERY 4 HOURS
Refills: 0 | Status: COMPLETED | OUTPATIENT
Start: 2019-11-01 | End: 2019-11-01

## 2019-11-01 RX ORDER — OXYCODONE AND ACETAMINOPHEN 5; 325 MG/1; MG/1
1 TABLET ORAL ONCE
Refills: 0 | Status: DISCONTINUED | OUTPATIENT
Start: 2019-11-01 | End: 2019-11-01

## 2019-11-01 RX ORDER — MAGNESIUM OXIDE 400 MG ORAL TABLET 241.3 MG
400 TABLET ORAL
Refills: 0 | Status: DISCONTINUED | OUTPATIENT
Start: 2019-11-01 | End: 2019-11-06

## 2019-11-01 RX ORDER — MAGNESIUM SULFATE 500 MG/ML
2 VIAL (ML) INJECTION
Refills: 0 | Status: DISCONTINUED | OUTPATIENT
Start: 2019-11-01 | End: 2019-11-01

## 2019-11-01 RX ORDER — METOPROLOL TARTRATE 50 MG
50 TABLET ORAL EVERY 6 HOURS
Refills: 0 | Status: DISCONTINUED | OUTPATIENT
Start: 2019-11-01 | End: 2019-11-06

## 2019-11-01 RX ORDER — METOPROLOL TARTRATE 50 MG
5 TABLET ORAL ONCE
Refills: 0 | Status: COMPLETED | OUTPATIENT
Start: 2019-11-01 | End: 2019-11-01

## 2019-11-01 RX ADMIN — MAGNESIUM OXIDE 400 MG ORAL TABLET 400 MILLIGRAM(S): 241.3 TABLET ORAL at 11:36

## 2019-11-01 RX ADMIN — Medication 50 MILLIGRAM(S): at 23:20

## 2019-11-01 RX ADMIN — Medication 60 MILLIGRAM(S): at 17:19

## 2019-11-01 RX ADMIN — APIXABAN 5 MILLIGRAM(S): 2.5 TABLET, FILM COATED ORAL at 17:20

## 2019-11-01 RX ADMIN — Medication 50 MILLIGRAM(S): at 17:19

## 2019-11-01 RX ADMIN — Medication 50 MILLIGRAM(S): at 06:10

## 2019-11-01 RX ADMIN — Medication 5 MILLIGRAM(S): at 18:54

## 2019-11-01 RX ADMIN — Medication 60 MILLIGRAM(S): at 23:20

## 2019-11-01 RX ADMIN — APIXABAN 5 MILLIGRAM(S): 2.5 TABLET, FILM COATED ORAL at 06:09

## 2019-11-01 RX ADMIN — Medication 60 MILLIGRAM(S): at 06:09

## 2019-11-01 RX ADMIN — Medication 60 MILLIGRAM(S): at 11:36

## 2019-11-01 RX ADMIN — OXYCODONE AND ACETAMINOPHEN 1 TABLET(S): 5; 325 TABLET ORAL at 16:50

## 2019-11-01 RX ADMIN — OXYCODONE AND ACETAMINOPHEN 1 TABLET(S): 5; 325 TABLET ORAL at 16:20

## 2019-11-01 RX ADMIN — Medication 25 GRAM(S): at 11:33

## 2019-11-01 RX ADMIN — MAGNESIUM OXIDE 400 MG ORAL TABLET 400 MILLIGRAM(S): 241.3 TABLET ORAL at 18:30

## 2019-11-01 RX ADMIN — Medication 25 GRAM(S): at 14:34

## 2019-11-01 RX ADMIN — Medication 50 MILLIGRAM(S): at 11:32

## 2019-11-01 NOTE — PROGRESS NOTE ADULT - ASSESSMENT
53 y/o M w/ PMHx morbid obesity, afib/flutter in the past s/p ablation has not been on blood thinners since ablation p/w SOB for 3 weeks    # Afib RVR-porrly controlled with runs of VTACH  -CV contraindicated secondary to LA thrombus, AC for 6 weeks then follow up  - cardizem 60q6  - metoprolol 50q8  - apixaban 5 mg BID  - BO showed LA appendage thrombus, continue AC and follow up in 6 weeks  -f/u EP for optimal rate control meds  -Mg>2 K>4    # SOB- likely tchycardia induced CM  - elevated D-dimer 786 on presentation  - due to patients body habitus cannot go for CT angio  - lower ext duplex no evidence of DVT  - ECHO showed EF 25 - 30 % with grade 3 diastolic dysfunction   -rate control    #Left atrial thrombus  -anticoagulation with eliquis    #Hypomagnesemia  -Suplement as needed    #GONZALEZ likely cardiorenal  - creatinine 2.1 on presentation improving  - no known history of CKD  - prerenal vs cardiorenal?  - will monitor     # MARTIN/OHS?  - needs sleep study as an outpatient     #Morbid obesity  -BMI 82.5  - Bariatric surgery evaluation  - Patient considering bariatric surgery as outpatient   -dietary recs appreciated    #Diet: Regular  #Activity: ambulate with assistance  #Dispo: Pending clinical improvemetn

## 2019-11-01 NOTE — PROGRESS NOTE ADULT - ASSESSMENT
55 y/o M w/ PMHx morbid obesity, afib/flutter in the past s/p ablation has not been on blood thinners since ablation p/w SOB for 3 weeks. Patient also states that for the last 3 weeks he has had increased BUCKLEY.    1.	A-Fib/Flutter with RVR  2.	Lt. Atrial appendage thrombus.  3.	Ac. HFrEF  4.	CM (EF 25-30%) likely tachycardia induced.   5.	Morbid obesity  6.	GONZALEZ on CKD-3         PLAN:    ·	NSVT on tele. EP F/U  ·	Tachycardiac. Increase Metoprolol to 50 mg po q 6h. Cont Diltiazem 60 mg po q 6h  ·	CV was cancelled as pt was found to have clot in LA appendage.   ·	EP recommended to cont Eliquis for six weeks and F/U with them as an out pt to reschedule for CV  ·	Cont Lasix 40 mg po daily  ·	Renal function improving. Follow BMP  ·	Check i's and o's and daily wt  ·	Low salt diet and water restriction to 1.5 L/D  ·	Will hold on starting ACE-I due to GONZALEZ   ·	ECHO reviewed. EF 25-30%. Multiple wall motion abnormalities.   ·	Percocet 5/325 PRN for backache.   ·	Dietary eval

## 2019-11-01 NOTE — PROGRESS NOTE ADULT - SUBJECTIVE AND OBJECTIVE BOX
ADAL FRENCH  54y Male    CHIEF COMPLAINT:    Patient is a 54y old  Male who presents with a chief complaint of     INTERVAL HPI/OVERNIGHT EVENTS:    Patient seen and examined. C/O backache. Remains tachycardiac. On tele having NSVT. No sob. No palpitations.     ROS: All other systems are negative.    Vital Signs:    T(F): 99.2 (11-01-19 @ 13:06), Max: 99.6 (11-01-19 @ 05:46)  HR: 92 (11-01-19 @ 13:06) (92 - 113)  BP: 122/72 (11-01-19 @ 13:06) (93/58 - 142/80)  RR: 18 (11-01-19 @ 13:06) (18 - 18)  SpO2: 95% (10-31-19 @ 17:48) (95% - 95%)  I&O's Summary    31 Oct 2019 07:01  -  01 Nov 2019 07:00  --------------------------------------------------------  IN: 440 mL / OUT: 4000 mL / NET: -3560 mL    01 Nov 2019 07:01  -  01 Nov 2019 16:15  --------------------------------------------------------  IN: 970 mL / OUT: 0 mL / NET: 970 mL      Daily     Daily   CAPILLARY BLOOD GLUCOSE          PHYSICAL EXAM:    GENERAL:  NAD  SKIN: No rashes or lesions  HENT: Atraumatic Normocephalic. PERRL. Moist membranes.  NECK: Supple, No JVD. No lymphadenopathy.  PULMONARY: CTA B/L. No wheezing. No rales  CVS: Normal S1, S2. Rate and Rhythm are IRIR. No murmurs.  ABDOMEN/GI: Soft, Nontender, Nondistended; BS present  EXTREMITIES: Peripheral pulses intact. 1+ pitting edema B/L LE.  NEUROLOGIC:  No motor or sensory deficit.  PSYCH: Alert & oriented x 3    Consultant(s) Notes Reviewed:  [x ] YES  [ ] NO  Care Discussed with Consultants/Other Providers [ x] YES  [ ] NO    EKG reviewed  Telemetry reviewed    LABS:                        13.2   9.88  )-----------( 178      ( 01 Nov 2019 07:42 )             42.7     11-01    142  |  96<L>  |  25<H>  ----------------------------<  105<H>  Creatinine Trend: 1.4<--, 1.3<--, 1.4<--, 1.5<--, 1.8<--, 1.7<--  4.2   |  35<H>  |  1.4    Ca    8.5      01 Nov 2019 07:42  Mg     1.7     11-01        Serum Pro-Brain Natriuretic Peptide: 3531 pg/mL (10-26-19 @ 00:00)          RADIOLOGY & ADDITIONAL TESTS:      Imaging or report Personally Reviewed:  [ ] YES  [ ] NO    Medications:  Standing  apixaban 5 milliGRAM(s) Oral every 12 hours  diltiazem    Tablet 60 milliGRAM(s) Oral every 6 hours  furosemide    Tablet 40 milliGRAM(s) Oral daily  magnesium oxide 400 milliGRAM(s) Oral three times a day with meals  metolazone 2.5 milliGRAM(s) Oral <User Schedule>  metoprolol tartrate 50 milliGRAM(s) Oral every 6 hours  oxycodone    5 mG/acetaminophen 325 mG 1 Tablet(s) Oral once  senna 1 Tablet(s) Oral daily    PRN Meds  bisacodyl 5 milliGRAM(s) Oral every 12 hours PRN  ondansetron Injectable 4 milliGRAM(s) IV Push once PRN      Case discussed with resident    Care discussed with pt/family

## 2019-11-01 NOTE — PROGRESS NOTE ADULT - SUBJECTIVE AND OBJECTIVE BOX
ADAL FRENCH 54y Male  MRN#: [id number]     SUBJECTIVE  Patient is a 54y old Male who presents with a chief complaint of   Today is hospital day 6d, and this morning he is lying in bed without distress.   7 run of vtach overnight  no chest apin  no sob  no abdominal pain  feels well  asks to go home      OBJECTIVE  PAST MEDICAL & SURGICAL HISTORY  Atrial fibrillation and flutter  History of prostate surgery  S/P ablation of atrial fibrillation  H/O skin graft    ALLERGIES:  Augmentin (Other)  clindamycin (Other)  penicillins (Other)    MEDICATIONS:  STANDING MEDICATIONS  apixaban 5 milliGRAM(s) Oral every 12 hours  diltiazem    Tablet 60 milliGRAM(s) Oral every 6 hours  furosemide    Tablet 40 milliGRAM(s) Oral daily  magnesium oxide 400 milliGRAM(s) Oral three times a day with meals  magnesium sulfate  IVPB 2 Gram(s) IV Intermittent every 4 hours  metolazone 2.5 milliGRAM(s) Oral <User Schedule>  metoprolol tartrate 50 milliGRAM(s) Oral every 6 hours  senna 1 Tablet(s) Oral daily    PRN MEDICATIONS  bisacodyl 5 milliGRAM(s) Oral every 12 hours PRN  ondansetron Injectable 4 milliGRAM(s) IV Push once PRN    HOME MEDICATIONS  Home Medications:      VITAL SIGNS: Last 24 Hours  T(C): 37.6 (01 Nov 2019 05:46), Max: 37.6 (01 Nov 2019 05:46)  T(F): 99.6 (01 Nov 2019 05:46), Max: 99.6 (01 Nov 2019 05:46)  HR: 113 (01 Nov 2019 05:46) (98 - 113)  BP: 132/92 (01 Nov 2019 05:46) (93/58 - 142/80)  BP(mean): --  RR: 18 (01 Nov 2019 05:46) (18 - 18)  SpO2: 95% (31 Oct 2019 17:48) (95% - 95%)    10-31-19 @ 07:01  -  11-01-19 @ 07:00  --------------------------------------------------------  IN: 440 mL / OUT: 4000 mL / NET: -3560 mL        LABS:                        13.2   9.88  )-----------( 178      ( 01 Nov 2019 07:42 )             42.7     11-01    142  |  96<L>  |  25<H>  ----------------------------<  105<H>  4.2   |  35<H>  |  1.4    Ca    8.5      01 Nov 2019 07:42  Mg     1.7     11-01            RADIOLOGY:    PHYSICAL EXAM:  PHYSICAL EXAM:  GENERAL: NAD, AAO x 4, 54y M  CHEST/LUNG: Clear to auscultation bilaterally; No wheeze; No crackles; No accessory muscles used  HEART: irregular  ABDOMEN: Soft, Nontender, Nondistended; Bowel sounds present; No guarding,obese  EXTREMITIES:2+ pitting edema bilateral lower extremities and sctroum swelling. Much improved from admission  NEUROLOGY: non-focal    ADMISSION SUMMARY  Patient is a 54y old Male who presents with a chief complaint of

## 2019-11-02 ENCOUNTER — TRANSCRIPTION ENCOUNTER (OUTPATIENT)
Age: 54
End: 2019-11-02

## 2019-11-02 LAB
ANION GAP SERPL CALC-SCNC: 13 MMOL/L — SIGNIFICANT CHANGE UP (ref 7–14)
BASOPHILS # BLD AUTO: 0.1 K/UL — SIGNIFICANT CHANGE UP (ref 0–0.2)
BASOPHILS NFR BLD AUTO: 1 % — SIGNIFICANT CHANGE UP (ref 0–1)
BUN SERPL-MCNC: 23 MG/DL — HIGH (ref 10–20)
CALCIUM SERPL-MCNC: 8.5 MG/DL — SIGNIFICANT CHANGE UP (ref 8.5–10.1)
CHLORIDE SERPL-SCNC: 98 MMOL/L — SIGNIFICANT CHANGE UP (ref 98–110)
CO2 SERPL-SCNC: 30 MMOL/L — SIGNIFICANT CHANGE UP (ref 17–32)
CREAT SERPL-MCNC: 1.3 MG/DL — SIGNIFICANT CHANGE UP (ref 0.7–1.5)
EOSINOPHIL # BLD AUTO: 0.33 K/UL — SIGNIFICANT CHANGE UP (ref 0–0.7)
EOSINOPHIL NFR BLD AUTO: 3.4 % — SIGNIFICANT CHANGE UP (ref 0–8)
GLUCOSE SERPL-MCNC: 104 MG/DL — HIGH (ref 70–99)
HCT VFR BLD CALC: 42.9 % — SIGNIFICANT CHANGE UP (ref 42–52)
HGB BLD-MCNC: 13.3 G/DL — LOW (ref 14–18)
IMM GRANULOCYTES NFR BLD AUTO: 0.3 % — SIGNIFICANT CHANGE UP (ref 0.1–0.3)
LYMPHOCYTES # BLD AUTO: 1.42 K/UL — SIGNIFICANT CHANGE UP (ref 1.2–3.4)
LYMPHOCYTES # BLD AUTO: 14.6 % — LOW (ref 20.5–51.1)
MAGNESIUM SERPL-MCNC: 2 MG/DL — SIGNIFICANT CHANGE UP (ref 1.8–2.4)
MCHC RBC-ENTMCNC: 27.7 PG — SIGNIFICANT CHANGE UP (ref 27–31)
MCHC RBC-ENTMCNC: 31 G/DL — LOW (ref 32–37)
MCV RBC AUTO: 89.4 FL — SIGNIFICANT CHANGE UP (ref 80–94)
MONOCYTES # BLD AUTO: 1.45 K/UL — HIGH (ref 0.1–0.6)
MONOCYTES NFR BLD AUTO: 14.9 % — HIGH (ref 1.7–9.3)
NEUTROPHILS # BLD AUTO: 6.4 K/UL — SIGNIFICANT CHANGE UP (ref 1.4–6.5)
NEUTROPHILS NFR BLD AUTO: 65.8 % — SIGNIFICANT CHANGE UP (ref 42.2–75.2)
NRBC # BLD: 0 /100 WBCS — SIGNIFICANT CHANGE UP (ref 0–0)
PLATELET # BLD AUTO: 163 K/UL — SIGNIFICANT CHANGE UP (ref 130–400)
POTASSIUM SERPL-MCNC: 4.7 MMOL/L — SIGNIFICANT CHANGE UP (ref 3.5–5)
POTASSIUM SERPL-SCNC: 4.7 MMOL/L — SIGNIFICANT CHANGE UP (ref 3.5–5)
RBC # BLD: 4.8 M/UL — SIGNIFICANT CHANGE UP (ref 4.7–6.1)
RBC # FLD: 15.9 % — HIGH (ref 11.5–14.5)
SODIUM SERPL-SCNC: 141 MMOL/L — SIGNIFICANT CHANGE UP (ref 135–146)
WBC # BLD: 9.73 K/UL — SIGNIFICANT CHANGE UP (ref 4.8–10.8)
WBC # FLD AUTO: 9.73 K/UL — SIGNIFICANT CHANGE UP (ref 4.8–10.8)

## 2019-11-02 PROCEDURE — 99232 SBSQ HOSP IP/OBS MODERATE 35: CPT

## 2019-11-02 PROCEDURE — 99233 SBSQ HOSP IP/OBS HIGH 50: CPT

## 2019-11-02 RX ORDER — DILTIAZEM HCL 120 MG
240 CAPSULE, EXT RELEASE 24 HR ORAL DAILY
Refills: 0 | Status: DISCONTINUED | OUTPATIENT
Start: 2019-11-02 | End: 2019-11-06

## 2019-11-02 RX ORDER — OXYCODONE AND ACETAMINOPHEN 5; 325 MG/1; MG/1
1 TABLET ORAL ONCE
Refills: 0 | Status: DISCONTINUED | OUTPATIENT
Start: 2019-11-02 | End: 2019-11-02

## 2019-11-02 RX ADMIN — MAGNESIUM OXIDE 400 MG ORAL TABLET 400 MILLIGRAM(S): 241.3 TABLET ORAL at 17:30

## 2019-11-02 RX ADMIN — OXYCODONE AND ACETAMINOPHEN 1 TABLET(S): 5; 325 TABLET ORAL at 14:12

## 2019-11-02 RX ADMIN — OXYCODONE AND ACETAMINOPHEN 1 TABLET(S): 5; 325 TABLET ORAL at 19:59

## 2019-11-02 RX ADMIN — Medication 60 MILLIGRAM(S): at 05:44

## 2019-11-02 RX ADMIN — OXYCODONE AND ACETAMINOPHEN 1 TABLET(S): 5; 325 TABLET ORAL at 19:02

## 2019-11-02 RX ADMIN — APIXABAN 5 MILLIGRAM(S): 2.5 TABLET, FILM COATED ORAL at 05:43

## 2019-11-02 RX ADMIN — Medication 60 MILLIGRAM(S): at 12:25

## 2019-11-02 RX ADMIN — Medication 40 MILLIGRAM(S): at 05:44

## 2019-11-02 RX ADMIN — Medication 50 MILLIGRAM(S): at 17:30

## 2019-11-02 RX ADMIN — APIXABAN 5 MILLIGRAM(S): 2.5 TABLET, FILM COATED ORAL at 17:29

## 2019-11-02 RX ADMIN — SENNA PLUS 1 TABLET(S): 8.6 TABLET ORAL at 12:25

## 2019-11-02 RX ADMIN — MAGNESIUM OXIDE 400 MG ORAL TABLET 400 MILLIGRAM(S): 241.3 TABLET ORAL at 08:00

## 2019-11-02 RX ADMIN — MAGNESIUM OXIDE 400 MG ORAL TABLET 400 MILLIGRAM(S): 241.3 TABLET ORAL at 12:25

## 2019-11-02 RX ADMIN — OXYCODONE AND ACETAMINOPHEN 1 TABLET(S): 5; 325 TABLET ORAL at 13:05

## 2019-11-02 RX ADMIN — Medication 50 MILLIGRAM(S): at 12:25

## 2019-11-02 RX ADMIN — Medication 50 MILLIGRAM(S): at 05:44

## 2019-11-02 RX ADMIN — Medication 60 MILLIGRAM(S): at 17:30

## 2019-11-02 NOTE — DISCHARGE NOTE PROVIDER - NSDCCPCAREPLAN_GEN_ALL_CORE_FT
PRINCIPAL DISCHARGE DIAGNOSIS  Diagnosis: Atrial fibrillation with RVR  Assessment and Plan of Treatment: Atrial Fibrillation  Atrial fibrillation is a type of irregular heartbeat (arrhythmia) where the heart quivers continuously in a chaotic pattern that makes the heart unable to pump blood normally. This can increase the risk for stroke, heart failure, and other heart-related conditions. Atrial fibrillation can be caused by a variety of conditions and may be temporary, intermittent, or permanent. Symptoms include feeling that your heart is beating rapidly or irregularly, chest discomfort, shortness of breath, or dizziness/lightheadedness that may be worse with exertion. Treatment is varied but may involve medication or electrical shock (cardioversion).  SEEK IMMEDIATE MEDICAL CARE IF YOU HAVE ANY OF THE FOLLOWING SYMPTOMS: chest pain, shortness of breath, abdominal pain, sweating, vomiting, blood in vomit/bowel movements/urine, dizziness/lightheadedness, weakness or numbness to face/arm/leg, trouble speaking or understanding, facial droop.        SECONDARY DISCHARGE DIAGNOSES  Diagnosis: BUCKLEY (dyspnea on exertion)  Assessment and Plan of Treatment: Shortness of breath  Shortness of breath (dyspnea) means you have trouble breathing and could indicate a medical problem. Causes include lung disease, heart disease, low amount of red blood cells (anemia), poor physical fitness, being overweight, smoking, etc. Your health care provider today may not be able to find a cause for your shortness of breath after your exam. In this case, it is important to have a follow-up exam with your primary care physician as instructed. If medicines were prescribed, take them as directed for the full length of time directed. Refrain from tobacco products.  SEEK IMMEDIATE MEDICAL CARE IF YOU HAVE ANY OF THE FOLLOWING SYMPTOMS: worsening shortness of breath, chest pain, back pain, abdominal pain, fever, coughing up blood, lightheadedness/dizziness.

## 2019-11-02 NOTE — DISCHARGE NOTE PROVIDER - NSDCMRMEDTOKEN_GEN_ALL_CORE_FT
apixaban 5 mg oral tablet: 1 tab(s) orally 2 times a day apixaban 5 mg oral tablet: 1 tab(s) orally 2 times a day   digoxin 250 mcg (0.25 mg) oral tablet: 1 tab(s) orally once a day  dilTIAZem 240 mg/24 hours oral capsule, extended release: 1 cap(s) orally once a day  furosemide 40 mg oral tablet: 1 tab(s) orally once a day  lisinopril 2.5 mg oral tablet: 1 tab(s) orally once a day   metOLazone 2.5 mg oral tablet: 1 tab(s) orally once a day   metoprolol succinate 200 mg oral tablet, extended release: 1 tab(s) orally once a day apixaban 5 mg oral tablet: 1 tab(s) orally 2 times a day   Diflucan 200 mg oral tablet: 1 tab(s) orally once a day   digoxin 250 mcg (0.25 mg) oral tablet: 1 tab(s) orally once a day  dilTIAZem 240 mg/24 hours oral capsule, extended release: 1 cap(s) orally once a day  furosemide 40 mg oral tablet: 1 tab(s) orally once a day  lisinopril 2.5 mg oral tablet: 1 tab(s) orally once a day   magnesium oxide 400 mg (241.3 mg elemental magnesium) oral tablet: 1 tab(s) orally 3 times a day (with meals)  metOLazone 2.5 mg oral tablet: 1 tab(s) orally once a day   metoprolol succinate 200 mg oral tablet, extended release: 1 tab(s) orally once a day   Nizoral 2% topical shampoo: Apply topically to affected area 2 times a day

## 2019-11-02 NOTE — DISCHARGE NOTE PROVIDER - CARE PROVIDER_API CALL
Rajat Redd; TONIO)  Cardiac Electrophysiology  [address] [place] [address]  Phone: [phone]  Fax: [phone]  Follow Up Time: 1 month    Doctor Tereza  Phone: (   )    -  Fax: (   )    -  Follow Up Time: 1 week

## 2019-11-02 NOTE — PROGRESS NOTE ADULT - SUBJECTIVE AND OBJECTIVE BOX
INTERVAL HPI/OVERNIGHT EVENTS:  s/p BO + JERALD clot  Metoprolol increased yesterday to 50mg q6h  -110 bpm    MEDICATIONS  (STANDING):  apixaban 5 milliGRAM(s) Oral every 12 hours  diltiazem    Tablet 60 milliGRAM(s) Oral every 6 hours  furosemide    Tablet 40 milliGRAM(s) Oral daily  magnesium oxide 400 milliGRAM(s) Oral three times a day with meals  metolazone 2.5 milliGRAM(s) Oral <User Schedule>  metoprolol tartrate 50 milliGRAM(s) Oral every 6 hours  senna 1 Tablet(s) Oral daily    MEDICATIONS  (PRN):  bisacodyl 5 milliGRAM(s) Oral every 12 hours PRN Constipation  ondansetron Injectable 4 milliGRAM(s) IV Push once PRN Nausea and/or Vomiting      Allergies    Augmentin (Other)  clindamycin (Other)  penicillins (Other)    Intolerances        REVIEW OF SYSTEMS    [x] A ten-point review of systems was otherwise negative except as noted.      ICU Vital Signs Last 24 Hrs  T(C): 35.6 (02 Nov 2019 05:29), Max: 37.3 (01 Nov 2019 13:06)  T(F): 96 (02 Nov 2019 05:29), Max: 99.2 (01 Nov 2019 13:06)  HR: 99 (02 Nov 2019 05:29) (92 - 125)  BP: 127/67 (02 Nov 2019 05:29) (116/68 - 127/67)  BP(mean): --  ABP: --  ABP(mean): --  RR: 20 (02 Nov 2019 05:29) (18 - 20)  SpO2: 95% (01 Nov 2019 16:35) (95% - 95%)        PHYSICAL EXAM:    GENERAL: In no apparent distress, well nourished, and hydrated.  HEART: IRRegular rate and rhythm; No murmur; NO rubs, or gallops.  PULMONARY: Clear to auscultation and percussion.  Normal expansion/effort. No rales, wheezing, or rhonchi bilaterally.  ABDOMEN: obeseSoft, Nontender, Nondistended; Bowel sounds present  EXTREMITIES:  Extremities warm, pink, well-perfused, 2+ Peripheral Pulses, No clubbing, cyanosis, 3+ edema  NEUROLOGICAL: alert & oriented x 3, no focal deficits, PERRLA, EOMI    LABS:                        13.7   10.07 )-----------( 200      ( 31 Oct 2019 07:48 )             44.0     10-31    141  |  99  |  27<H>  ----------------------------<  105<H>  3.9   |  32  |  1.3    Ca    8.8      31 Oct 2019 07:48  Mg     1.7     10-31            RADIOLOGY & ADDITIONAL TESTS:

## 2019-11-02 NOTE — PROGRESS NOTE ADULT - SUBJECTIVE AND OBJECTIVE BOX
ADAL FRENCH 54y Male  MRN#: [id number]     SUBJECTIVE  Patient is a 54y old Male who presents with a chief complaint of shortness of breath x 3 weeks (02 Nov 2019 15:35)  Today is hospital day 7d, and this morning he is lying in bed without distress.   No acute overnight events.   feels better each day  compliant with meds  wants to pursue bariatric surgery  no chest pain  no SOB  no abodminal pain    OBJECTIVE  PAST MEDICAL & SURGICAL HISTORY  Atrial fibrillation and flutter  History of prostate surgery  S/P ablation of atrial fibrillation  H/O skin graft    ALLERGIES:  Augmentin (Other)  clindamycin (Other)  penicillins (Other)    MEDICATIONS:  STANDING MEDICATIONS  apixaban 5 milliGRAM(s) Oral every 12 hours  diltiazem    milliGRAM(s) Oral daily  furosemide    Tablet 40 milliGRAM(s) Oral daily  magnesium oxide 400 milliGRAM(s) Oral three times a day with meals  metolazone 2.5 milliGRAM(s) Oral <User Schedule>  metoprolol tartrate 50 milliGRAM(s) Oral every 6 hours  senna 1 Tablet(s) Oral daily    PRN MEDICATIONS  bisacodyl 5 milliGRAM(s) Oral every 12 hours PRN  ondansetron Injectable 4 milliGRAM(s) IV Push once PRN    HOME MEDICATIONS  Home Medications:      VITAL SIGNS: Last 24 Hours  T(C): 36.7 (02 Nov 2019 20:28), Max: 36.7 (02 Nov 2019 20:28)  T(F): 98 (02 Nov 2019 20:28), Max: 98 (02 Nov 2019 20:28)  HR: 89 (02 Nov 2019 20:28) (89 - 102)  BP: 130/86 (02 Nov 2019 20:28) (115/71 - 130/86)  BP(mean): --  RR: 20 (02 Nov 2019 20:28) (20 - 20)  SpO2: --    11-01-19 @ 07:01  -  11-02-19 @ 07:00  --------------------------------------------------------  IN: 1210 mL / OUT: 2800 mL / NET: -1590 mL    11-02-19 @ 08:01  -  11-02-19 @ 22:16  --------------------------------------------------------  IN: 720 mL / OUT: 1000 mL / NET: -280 mL        LABS:                        13.3   9.73  )-----------( 163      ( 02 Nov 2019 06:01 )             42.9     11-02    141  |  98  |  23<H>  ----------------------------<  104<H>  4.7   |  30  |  1.3    Ca    8.5      02 Nov 2019 06:01  Mg     2.0     11-02                      CAPILLARY BLOOD GLUCOSE          RADIOLOGY:    PHYSICAL EXAM:  PHYSICAL EXAM:  GENERAL: NAD, AAO x 4, 54y M obese  CHEST/LUNG: decreased sioudns at bases  HEART: irregualr;   ABDOMEN: Soft, Nontender, Nondistended; Bowel sounds present; No guarding  EXTREMITIES:  large 2+ edema bilateral lower extremities and scrotal edema  NEUROLOGY: non-focal    ADMISSION SUMMARY  Patient is a 54y old Male who presents with a chief complaint of shortness of breath x 3 weeks (02 Nov 2019 15:35)

## 2019-11-02 NOTE — PROGRESS NOTE ADULT - ASSESSMENT
53 y/o M w/ PMHx morbid obesity, afib/flutter in the past s/p ablation has not been on blood thinners since ablation p/w SOB for 3 weeks. Patient also states that for the last 3 weeks he has had increased BUCKLEY.    1.	A-Fib/Flutter with RVR  2.	Lt. Atrial appendage thrombus.  3.	Acute HFrEF  4.	CM (EF 25-30%) likely tachycardia induced.   5.	Morbid obesity BMI 82.5  6.	GONZALEZ on CKD-3 - improving         PLAN:    ·	HR better controlled now on current doses of metoprolol and diltiazem - evaluate when pt is ambulating  ·	continue telemetry  ·	EP recommended to cont Eliquis for six weeks and F/U with them as an out pt to reschedule for CV  ·	Cont Lasix 40 mg po daily and metolazone  ·	Renal function improving. Follow BMP  ·	Check i's and o's and daily wts  ·	Low salt diet and water restriction to 1.5 L/D  ·	No ACE-I or ARB now due to GONZALEZ   ·	Percocet 5/325 PRN for backache.   ·	physical therapy eval today - pt ambulates with cane at home      PROGRESS NOTE HANDOFF    Pending: ambulation with physical therapy and monitor HR    Family discussion: yes    Disposition: home with services

## 2019-11-02 NOTE — DISCHARGE NOTE PROVIDER - PROVIDER TOKENS
PROVIDER:[TOKEN:[[id number]],FOLLOWUP:[1 month]],FREE:[LAST:[Tereza],FIRST:[Doctor],PHONE:[(   )    -],FAX:[(   )    -],FOLLOWUP:[1 week]]

## 2019-11-02 NOTE — PROGRESS NOTE ADULT - SUBJECTIVE AND OBJECTIVE BOX
ADAL FRENCH  54y Male    INTERVAL HPI/OVERNIGHT EVENTS:    No complaints. Willing to ambulate today. Wife at bedside.  EP f/u appreciated.  HR better controlled at rest now.    T(F): 96 (11-02-19 @ 05:29), Max: 98 (11-01-19 @ 20:52)  HR: 102 (11-02-19 @ 12:15) (99 - 125)  BP: 115/71 (11-02-19 @ 12:15) (115/71 - 127/67)  RR: 20 (11-02-19 @ 05:29) (18 - 20)  SpO2: 95% (11-01-19 @ 16:35) (95% - 95%) on RA    I&O's Summary    01 Nov 2019 07:01  -  02 Nov 2019 07:00  --------------------------------------------------------  IN: 1210 mL / OUT: 2800 mL / NET: -1590 mL    02 Nov 2019 08:01  -  02 Nov 2019 14:41  --------------------------------------------------------  IN: 720 mL / OUT: 1000 mL / NET: -280 mL        PHYSICAL EXAM:  GENERAL: NAD  HEAD:  Normocephalic  EYES:  conjunctiva and sclera clear  ENMT: Moist mucous membranes  NECK: Supple,  NERVOUS SYSTEM:  Alert & Oriented X3, Good concentration  CHEST/LUNG: Clear to percussion bilaterally  HEART: IRR  ABDOMEN: Soft, Nontender, Nondistended;obese  EXTREMITIES:  2+LE edema    Consultant(s) Notes Reviewed:  [x ] YES  [ ] NO  Care Discussed with Consultants/Other Providers [ x] YES  [ ] NO    MEDICATIONS  (STANDING):  apixaban 5 milliGRAM(s) Oral every 12 hours  diltiazem    Tablet 60 milliGRAM(s) Oral every 6 hours  furosemide    Tablet 40 milliGRAM(s) Oral daily  magnesium oxide 400 milliGRAM(s) Oral three times a day with meals  metolazone 2.5 milliGRAM(s) Oral <User Schedule>  metoprolol tartrate 50 milliGRAM(s) Oral every 6 hours  senna 1 Tablet(s) Oral daily    MEDICATIONS  (PRN):  bisacodyl 5 milliGRAM(s) Oral every 12 hours PRN Constipation  ondansetron Injectable 4 milliGRAM(s) IV Push once PRN Nausea and/or Vomiting      Telemetry reviewed    LABS:                        13.3   9.73  )-----------( 163      ( 02 Nov 2019 06:01 )             42.9     11-02    141  |  98  |  23<H>  ----------------------------<  104<H>  4.7   |  30  |  1.3    Ca    8.5      02 Nov 2019 06:01  Mg     2.0     11-02      RADIOLOGY & ADDITIONAL TESTS:    Imaging or report Personally Reviewed:  [x ] YES  [ ] NO    < from: Intra-Operative Transesophageal Echo (10.29.19 @ 13:47) >  Summary:   1. There was a moderate size thrombus present in the JERALD.    < end of copied text >    < from: Transthoracic Echocardiogram (10.27.19 @ 15:56) >  Summary:   1. Left ventricular ejection fraction, by visual estimation, is 25 to   30%.   2. Severely decreased global left ventricular systolic function.   3. Multiple left ventricular regional wall motion abnormalities exist.   See wall motion findings.   4. Elevated left atrial and left ventricular end-diastolic pressures.   5. Mildly increased left ventricular internal cavity size.   6. Spectral Doppler shows restrictive pattern of left ventricular   myocardial filling (Grade III diastolic dysfunction).   7. Moderately enlarged right ventricle.   8. Moderately reduced RV systolic function.   9. Moderately enlarged right atrium.  10. Moderate to severe mitral valve regurgitation.  11. LA volume Index is 50.0 ml/m² ml/m2.    < end of copied text >        Case discussed with resident    Care discussed with pt and family

## 2019-11-02 NOTE — PROGRESS NOTE ADULT - ATTENDING COMMENTS
COVERING Dr. Ivania MARQUES thrombus  AF with RVR      con't tele  Continue Metoprolol 50mg q6h and Diltiazem 60mg q6h  convert to long acting prior to discharge  oob to evaluate rate response  Maintain electrolytes K>4.0 Mg >2.0 to avoid ectopy  con't eliquis

## 2019-11-02 NOTE — DISCHARGE NOTE PROVIDER - HOSPITAL COURSE
53 y/o M w/ PMHx morbid obesity, afib/flutter in the past s/p ablation has not been on blood thinners since ablation p/w SOB for 3 weeks. Patient also states that for the last 3 weeks he has had increased BUCKLEY. He was admitted and diagnosed with:        A-Fib/Flutter with RVR    Lt. Atrial appendage thrombus found on BO    Acute HFrEF    CM (EF 25-30%) likely tachycardia induced.     Morbid obesity BMI 82.5    GONZALEZ on CKD-3 - improving        Cardioversion not done due to JERALD thrombus and pt is on Eliquis.    He is on metoprolol and cardizem for rate control.    He will f/u with EP in 6 weeks for evaluation and possible cardioversion.    GONZALEZ improving    He is on PO lasix and metolazone for acute HFrEF/volume overload.    He will be discharged home when he can ambulate safely. 55 y/o M w/ PMHx morbid obesity, afib/flutter in the past s/p ablation has not been on blood thinners since ablation p/w SOB for 3 weeks. Patient also states that for the last 3 weeks he has had increased BUCKLEY. He was admitted and diagnosed with:        A-Fib/Flutter with RVR    Lt. Atrial appendage thrombus found on BO    Acute HFrEF    CM (EF 25-30%) likely tachycardia induced.     Morbid obesity BMI 82.5    GONZALEZ on CKD-3 - improving        Cardioversion not done due to JERALD thrombus and pt is on Eliquis.    He is on metoprolol and cardizem and digoxin for rate control.    He will f/u with EP in 6 weeks for evaluation and possible cardioversion.    GONZALEZ improving    He is on PO lasix and metolazone for acute HFrEF/volume overload.    He will be discharged home when he can ambulate safely.    Patient instructed to f/u with PCP within 1 week for digoxin levels and BMP

## 2019-11-02 NOTE — PROGRESS NOTE ADULT - ASSESSMENT
55 y/o M w/ PMHx morbid obesity, afib/flutter in the past s/p ablation has not been on blood thinners since ablation p/w SOB for 3 weeks    # Afib RVR-porrly controlled with runs of VTACH  -CV contraindicated secondary to LA thrombus, AC for 6 weeks then follow up  - cardizem switched to 240mg daily  - metoprolol 50q6  - apixaban 5 mg BID  - BO showed LA appendage thrombus, continue AC and follow up in 6 weeks  -f/u EP for optimal rate control meds  -Mg>2 K>4    # SOB- likely tchycardia induced CM  - elevated D-dimer 786 on presentation  - due to patients body habitus cannot go for CT angio  - lower ext duplex no evidence of DVT  - ECHO showed EF 25 - 30 % with grade 3 diastolic dysfunction   -rate control w/ cardizem and metoprolol    #Left atrial thrombus  -anticoagulation with eliquis  -f/u in 6 weeks with EP    #Hypomagnesemia  -Suplement as needed  -PO mag oxide    #GONZALEZ likely cardiorenal  - resolved  - no known history of CKD  - prerenal vs cardiorenal    # MARTIN/OHS?  - needs sleep study as an outpatient     #Morbid obesity  -BMI 82.5  - Bariatric surgery evaluation  - Patient considering bariatric surgery as outpatient   -dietary recs appreciated    #Diet: Regular  #Activity: ambulate with assistance  #Dispo: Pending clinical improvemetn

## 2019-11-02 NOTE — PROGRESS NOTE ADULT - ASSESSMENT
55 y/o M w/ PMHx morbid obesity, typical AFL s/p typical  ablation (not AF) has not been on blood thinners since ablation p/w SOB for 3 weeks. Was found in AF RVR  New onset dilated cardiomyopathy EF 25-30% probably tachycardia induced

## 2019-11-02 NOTE — DISCHARGE NOTE PROVIDER - INSTRUCTIONS
Eat more vegetables and fruits.  Swap refined grains for whole grains.  Choose fat-free or low-fat dairy products.  Choose lean protein sources like fish, poultry and beans.  Cook with vegetable oils.  Limit your intake of foods high in added sugars, like soda and candy.

## 2019-11-03 PROCEDURE — 99233 SBSQ HOSP IP/OBS HIGH 50: CPT

## 2019-11-03 RX ORDER — OXYCODONE AND ACETAMINOPHEN 5; 325 MG/1; MG/1
1 TABLET ORAL EVERY 4 HOURS
Refills: 0 | Status: DISCONTINUED | OUTPATIENT
Start: 2019-11-03 | End: 2019-11-03

## 2019-11-03 RX ORDER — OXYCODONE AND ACETAMINOPHEN 5; 325 MG/1; MG/1
1 TABLET ORAL EVERY 6 HOURS
Refills: 0 | Status: DISCONTINUED | OUTPATIENT
Start: 2019-11-03 | End: 2019-11-03

## 2019-11-03 RX ORDER — OXYCODONE AND ACETAMINOPHEN 5; 325 MG/1; MG/1
2 TABLET ORAL EVERY 4 HOURS
Refills: 0 | Status: DISCONTINUED | OUTPATIENT
Start: 2019-11-03 | End: 2019-11-03

## 2019-11-03 RX ORDER — OXYCODONE AND ACETAMINOPHEN 5; 325 MG/1; MG/1
1 TABLET ORAL ONCE
Refills: 0 | Status: DISCONTINUED | OUTPATIENT
Start: 2019-11-03 | End: 2019-11-03

## 2019-11-03 RX ORDER — OXYCODONE AND ACETAMINOPHEN 5; 325 MG/1; MG/1
2 TABLET ORAL EVERY 4 HOURS
Refills: 0 | Status: DISCONTINUED | OUTPATIENT
Start: 2019-11-03 | End: 2019-11-06

## 2019-11-03 RX ORDER — HYDROCORTISONE 1 %
1 OINTMENT (GRAM) TOPICAL ONCE
Refills: 0 | Status: DISCONTINUED | OUTPATIENT
Start: 2019-11-03 | End: 2019-11-05

## 2019-11-03 RX ADMIN — MAGNESIUM OXIDE 400 MG ORAL TABLET 400 MILLIGRAM(S): 241.3 TABLET ORAL at 18:26

## 2019-11-03 RX ADMIN — OXYCODONE AND ACETAMINOPHEN 1 TABLET(S): 5; 325 TABLET ORAL at 15:27

## 2019-11-03 RX ADMIN — MAGNESIUM OXIDE 400 MG ORAL TABLET 400 MILLIGRAM(S): 241.3 TABLET ORAL at 08:26

## 2019-11-03 RX ADMIN — Medication 50 MILLIGRAM(S): at 11:13

## 2019-11-03 RX ADMIN — OXYCODONE AND ACETAMINOPHEN 1 TABLET(S): 5; 325 TABLET ORAL at 10:55

## 2019-11-03 RX ADMIN — Medication 40 MILLIGRAM(S): at 05:17

## 2019-11-03 RX ADMIN — APIXABAN 5 MILLIGRAM(S): 2.5 TABLET, FILM COATED ORAL at 05:17

## 2019-11-03 RX ADMIN — Medication 240 MILLIGRAM(S): at 05:17

## 2019-11-03 RX ADMIN — APIXABAN 5 MILLIGRAM(S): 2.5 TABLET, FILM COATED ORAL at 18:26

## 2019-11-03 RX ADMIN — SENNA PLUS 1 TABLET(S): 8.6 TABLET ORAL at 11:13

## 2019-11-03 RX ADMIN — MAGNESIUM OXIDE 400 MG ORAL TABLET 400 MILLIGRAM(S): 241.3 TABLET ORAL at 13:13

## 2019-11-03 RX ADMIN — Medication 50 MILLIGRAM(S): at 18:26

## 2019-11-03 RX ADMIN — OXYCODONE AND ACETAMINOPHEN 1 TABLET(S): 5; 325 TABLET ORAL at 09:17

## 2019-11-03 RX ADMIN — OXYCODONE AND ACETAMINOPHEN 1 TABLET(S): 5; 325 TABLET ORAL at 18:27

## 2019-11-03 RX ADMIN — OXYCODONE AND ACETAMINOPHEN 1 TABLET(S): 5; 325 TABLET ORAL at 19:08

## 2019-11-03 RX ADMIN — Medication 50 MILLIGRAM(S): at 05:16

## 2019-11-03 RX ADMIN — OXYCODONE AND ACETAMINOPHEN 1 TABLET(S): 5; 325 TABLET ORAL at 18:32

## 2019-11-03 RX ADMIN — Medication 50 MILLIGRAM(S): at 00:25

## 2019-11-03 NOTE — PHYSICAL THERAPY INITIAL EVALUATION ADULT - GAIT DEVIATIONS NOTED, PT EVAL
decreased stuart/decreased weight-shifting ability/decreased step length/decreased stride length/decreased velocity of limb motion

## 2019-11-03 NOTE — PROGRESS NOTE ADULT - SUBJECTIVE AND OBJECTIVE BOX
GILLIAN ADAL  54y Male    INTERVAL HPI/OVERNIGHT EVENTS:    HR in the  range at rest.   He ambulated with physical therapy today and HR went up to 150 per RN.  He was SOB after the therapy.  Wife at bedside.  Pt and wife want home with services on discharge when medically stable - he refuses STR at SNF.   Spoke to pt with wife, RN and  present.    T(F): 97 (19 @ 05:05), Max: 98 (19 @ 20:28)  HR: 88 (19 @ 05:05) (88 - 101)  BP: 131/60 (19 @ 05:05) (127/72 - 131/60)  RR: 20 (19 @ 05:05) (20 - 20)  SpO2: --    I&O's Summary    2019 08:  -  2019 07:00  --------------------------------------------------------  IN: 720 mL / OUT: 3000 mL / NET: -2280 mL    2019 07:01  -  2019 12:36  --------------------------------------------------------  IN: 300 mL / OUT: 750 mL / NET: -450 mL        Daily Weight in k.9 (2019 05:05)      PHYSICAL EXAM:  GENERAL: NAD  HEAD:  Normocephalic  EYES:  conjunctiva and sclera clear  ENMT: Moist mucous membranes  NECK: Supple  NERVOUS SYSTEM:  Alert & Oriented X3, Good concentration  CHEST/LUNG: Clear to percussion bilaterally  HEART: IRR  ABDOMEN: Soft, Nontender, Nondistended, obese  EXTREMITIES:  2+ LE edema      Consultant(s) Notes Reviewed:  [x ] YES  [ ] NO  Care Discussed with Consultants/Other Providers [ x] YES  [ ] NO    MEDICATIONS  (STANDING):  apixaban 5 milliGRAM(s) Oral every 12 hours  diltiazem    milliGRAM(s) Oral daily  furosemide    Tablet 40 milliGRAM(s) Oral daily  magnesium oxide 400 milliGRAM(s) Oral three times a day with meals  metolazone 2.5 milliGRAM(s) Oral <User Schedule>  metoprolol tartrate 50 milliGRAM(s) Oral every 6 hours  senna 1 Tablet(s) Oral daily    MEDICATIONS  (PRN):  bisacodyl 5 milliGRAM(s) Oral every 12 hours PRN Constipation  ondansetron Injectable 4 milliGRAM(s) IV Push once PRN Nausea and/or Vomiting  oxycodone    5 mG/acetaminophen 325 mG 2 Tablet(s) Oral every 4 hours PRN Severe Pain (7 - 10)    Telemetry reviewed    LABS:                        13.3   9.73  )-----------( 163      ( 2019 06:01 )             42.9     11-02    141  |  98  |  23<H>  ----------------------------<  104<H>  4.7   |  30  |  1.3    Ca    8.5      2019 06:01  Mg     2.0     11-02                    Care discussed with pt and family

## 2019-11-03 NOTE — PHYSICAL THERAPY INITIAL EVALUATION ADULT - GENERAL OBSERVATIONS, REHAB EVAL
chart reviewed - pt refused PT IE at this time - unclear why - pt reported he is not in pain - PHILLIP mcneal
[id number] pm Chart reviewed. Pt. seen semirecline in bed , in No apparent distress , + telemetry , IV lock, morbidly obese, Prima fit device , + scrotal and B/L lower extremities edema

## 2019-11-04 ENCOUNTER — TRANSCRIPTION ENCOUNTER (OUTPATIENT)
Age: 54
End: 2019-11-04

## 2019-11-04 LAB
ANION GAP SERPL CALC-SCNC: 14 MMOL/L — SIGNIFICANT CHANGE UP (ref 7–14)
BASOPHILS # BLD AUTO: 0.08 K/UL — SIGNIFICANT CHANGE UP (ref 0–0.2)
BASOPHILS NFR BLD AUTO: 0.9 % — SIGNIFICANT CHANGE UP (ref 0–1)
BUN SERPL-MCNC: 26 MG/DL — HIGH (ref 10–20)
CALCIUM SERPL-MCNC: 8.5 MG/DL — SIGNIFICANT CHANGE UP (ref 8.5–10.1)
CHLORIDE SERPL-SCNC: 92 MMOL/L — LOW (ref 98–110)
CO2 SERPL-SCNC: 33 MMOL/L — HIGH (ref 17–32)
CREAT SERPL-MCNC: 1.5 MG/DL — SIGNIFICANT CHANGE UP (ref 0.7–1.5)
EOSINOPHIL # BLD AUTO: 0.37 K/UL — SIGNIFICANT CHANGE UP (ref 0–0.7)
EOSINOPHIL NFR BLD AUTO: 4.4 % — SIGNIFICANT CHANGE UP (ref 0–8)
GLUCOSE SERPL-MCNC: 101 MG/DL — HIGH (ref 70–99)
HCT VFR BLD CALC: 43.7 % — SIGNIFICANT CHANGE UP (ref 42–52)
HGB BLD-MCNC: 13.4 G/DL — LOW (ref 14–18)
IMM GRANULOCYTES NFR BLD AUTO: 0.6 % — HIGH (ref 0.1–0.3)
LYMPHOCYTES # BLD AUTO: 0.97 K/UL — LOW (ref 1.2–3.4)
LYMPHOCYTES # BLD AUTO: 11.5 % — LOW (ref 20.5–51.1)
MAGNESIUM SERPL-MCNC: 1.8 MG/DL — SIGNIFICANT CHANGE UP (ref 1.8–2.4)
MCHC RBC-ENTMCNC: 27.6 PG — SIGNIFICANT CHANGE UP (ref 27–31)
MCHC RBC-ENTMCNC: 30.7 G/DL — LOW (ref 32–37)
MCV RBC AUTO: 90.1 FL — SIGNIFICANT CHANGE UP (ref 80–94)
MONOCYTES # BLD AUTO: 1.2 K/UL — HIGH (ref 0.1–0.6)
MONOCYTES NFR BLD AUTO: 14.2 % — HIGH (ref 1.7–9.3)
NEUTROPHILS # BLD AUTO: 5.8 K/UL — SIGNIFICANT CHANGE UP (ref 1.4–6.5)
NEUTROPHILS NFR BLD AUTO: 68.4 % — SIGNIFICANT CHANGE UP (ref 42.2–75.2)
NRBC # BLD: 0 /100 WBCS — SIGNIFICANT CHANGE UP (ref 0–0)
PLATELET # BLD AUTO: 186 K/UL — SIGNIFICANT CHANGE UP (ref 130–400)
POTASSIUM SERPL-MCNC: 4.9 MMOL/L — SIGNIFICANT CHANGE UP (ref 3.5–5)
POTASSIUM SERPL-SCNC: 4.9 MMOL/L — SIGNIFICANT CHANGE UP (ref 3.5–5)
RBC # BLD: 4.85 M/UL — SIGNIFICANT CHANGE UP (ref 4.7–6.1)
RBC # FLD: 15.9 % — HIGH (ref 11.5–14.5)
SODIUM SERPL-SCNC: 139 MMOL/L — SIGNIFICANT CHANGE UP (ref 135–146)
WBC # BLD: 8.47 K/UL — SIGNIFICANT CHANGE UP (ref 4.8–10.8)
WBC # FLD AUTO: 8.47 K/UL — SIGNIFICANT CHANGE UP (ref 4.8–10.8)

## 2019-11-04 PROCEDURE — 99232 SBSQ HOSP IP/OBS MODERATE 35: CPT

## 2019-11-04 PROCEDURE — 99233 SBSQ HOSP IP/OBS HIGH 50: CPT

## 2019-11-04 RX ORDER — DIPHENHYDRAMINE HCL 50 MG
50 CAPSULE ORAL EVERY 6 HOURS
Refills: 0 | Status: DISCONTINUED | OUTPATIENT
Start: 2019-11-04 | End: 2019-11-06

## 2019-11-04 RX ORDER — DIGOXIN 250 MCG
0.25 TABLET ORAL EVERY 8 HOURS
Refills: 0 | Status: DISCONTINUED | OUTPATIENT
Start: 2019-11-04 | End: 2019-11-04

## 2019-11-04 RX ORDER — NYSTATIN CREAM 100000 [USP'U]/G
1 CREAM TOPICAL EVERY 12 HOURS
Refills: 0 | Status: DISCONTINUED | OUTPATIENT
Start: 2019-11-04 | End: 2019-11-05

## 2019-11-04 RX ORDER — DIGOXIN 250 MCG
0.25 TABLET ORAL DAILY
Refills: 0 | Status: DISCONTINUED | OUTPATIENT
Start: 2019-11-05 | End: 2019-11-06

## 2019-11-04 RX ORDER — DIGOXIN 250 MCG
0.25 TABLET ORAL EVERY 8 HOURS
Refills: 0 | Status: COMPLETED | OUTPATIENT
Start: 2019-11-04 | End: 2019-11-05

## 2019-11-04 RX ORDER — NYSTATIN CREAM 100000 [USP'U]/G
1 CREAM TOPICAL
Refills: 0 | Status: DISCONTINUED | OUTPATIENT
Start: 2019-11-04 | End: 2019-11-05

## 2019-11-04 RX ORDER — DIGOXIN 250 MCG
0.5 TABLET ORAL ONCE
Refills: 0 | Status: COMPLETED | OUTPATIENT
Start: 2019-11-04 | End: 2019-11-04

## 2019-11-04 RX ADMIN — Medication 50 MILLIGRAM(S): at 21:54

## 2019-11-04 RX ADMIN — Medication 0.5 MILLIGRAM(S): at 11:09

## 2019-11-04 RX ADMIN — Medication 40 MILLIGRAM(S): at 06:38

## 2019-11-04 RX ADMIN — OXYCODONE AND ACETAMINOPHEN 2 TABLET(S): 5; 325 TABLET ORAL at 20:00

## 2019-11-04 RX ADMIN — OXYCODONE AND ACETAMINOPHEN 2 TABLET(S): 5; 325 TABLET ORAL at 06:37

## 2019-11-04 RX ADMIN — MAGNESIUM OXIDE 400 MG ORAL TABLET 400 MILLIGRAM(S): 241.3 TABLET ORAL at 18:55

## 2019-11-04 RX ADMIN — MAGNESIUM OXIDE 400 MG ORAL TABLET 400 MILLIGRAM(S): 241.3 TABLET ORAL at 11:11

## 2019-11-04 RX ADMIN — NYSTATIN CREAM 1 APPLICATION(S): 100000 CREAM TOPICAL at 21:55

## 2019-11-04 RX ADMIN — OXYCODONE AND ACETAMINOPHEN 2 TABLET(S): 5; 325 TABLET ORAL at 18:55

## 2019-11-04 RX ADMIN — Medication 50 MILLIGRAM(S): at 18:55

## 2019-11-04 RX ADMIN — OXYCODONE AND ACETAMINOPHEN 2 TABLET(S): 5; 325 TABLET ORAL at 07:18

## 2019-11-04 RX ADMIN — APIXABAN 5 MILLIGRAM(S): 2.5 TABLET, FILM COATED ORAL at 06:39

## 2019-11-04 RX ADMIN — Medication 240 MILLIGRAM(S): at 06:38

## 2019-11-04 RX ADMIN — APIXABAN 5 MILLIGRAM(S): 2.5 TABLET, FILM COATED ORAL at 18:55

## 2019-11-04 RX ADMIN — Medication 50 MILLIGRAM(S): at 00:31

## 2019-11-04 RX ADMIN — Medication 50 MILLIGRAM(S): at 11:10

## 2019-11-04 RX ADMIN — Medication 100 MILLIGRAM(S): at 18:57

## 2019-11-04 RX ADMIN — Medication 50 MILLIGRAM(S): at 06:38

## 2019-11-04 RX ADMIN — MAGNESIUM OXIDE 400 MG ORAL TABLET 400 MILLIGRAM(S): 241.3 TABLET ORAL at 08:22

## 2019-11-04 RX ADMIN — Medication 0.25 MILLIGRAM(S): at 18:56

## 2019-11-04 NOTE — PROGRESS NOTE ADULT - SUBJECTIVE AND OBJECTIVE BOX
INTERVAL HPI/OVERNIGHT EVENTS:  Patient remains in AF 97 BPM, rates up to 150s. Patient without complaints at this time.     MEDICATIONS  (STANDING):  apixaban 5 milliGRAM(s) Oral every 12 hours  digoxin     Tablet 0.25 milliGRAM(s) Oral daily  digoxin  Injectable 0.25 milliGRAM(s) IV Push every 8 hours  diltiazem    milliGRAM(s) Oral daily  furosemide    Tablet 40 milliGRAM(s) Oral daily  hydrocortisone 1% Ointment 1 Application(s) Topical once  magnesium oxide 400 milliGRAM(s) Oral three times a day with meals  metolazone 2.5 milliGRAM(s) Oral <User Schedule>  metoprolol tartrate 50 milliGRAM(s) Oral every 6 hours  senna 1 Tablet(s) Oral daily    MEDICATIONS  (PRN):  bisacodyl 5 milliGRAM(s) Oral every 12 hours PRN Constipation  ondansetron Injectable 4 milliGRAM(s) IV Push once PRN Nausea and/or Vomiting  oxycodone    5 mG/acetaminophen 325 mG 2 Tablet(s) Oral every 4 hours PRN Moderate Pain (4 - 6)      Allergies    Augmentin (Other)  clindamycin (Other)  penicillins (Other)    Intolerances        REVIEW OF SYSTEMS  A ten-point review of systems was otherwise negative except as noted.      Vital Signs Last 24 Hrs  T(C): 36.3 (04 Nov 2019 06:00), Max: 36.3 (04 Nov 2019 06:00)  T(F): 97.4 (04 Nov 2019 06:00), Max: 97.4 (04 Nov 2019 06:00)  HR: 105 (04 Nov 2019 06:00) (95 - 122)  BP: 118/59 (04 Nov 2019 06:00) (118/59 - 130/62)  BP(mean): --  RR: 20 (04 Nov 2019 06:00) (19 - 20)  SpO2: --    11-03-19 @ 07:01  -  11-04-19 @ 07:00  --------------------------------------------------------  IN: 300 mL / OUT: 2101 mL / NET: -1801 mL        Physical Exam  GENERAL: In no apparent distress, morbidly obese  HEART: Irregular rate and rhythm; No murmurs, rubs, or gallops.  PULMONARY: Clear to auscultation and perfusion.  No rales, wheezing, or rhonchi bilaterally.  ABDOMEN: Soft, Nontender, Nondistended; Bowel sounds present  EXTREMITIES: 2+ LE edema.  2+ Peripheral Pulses, No clubbing, cyanosis  NEUROLOGICAL: Grossly nonfocal. Speech clear, MARIO.    LABS:                        13.4   8.47  )-----------( 186      ( 04 Nov 2019 06:19 )             43.7     11-04    139  |  92<L>  |  26<H>  ----------------------------<  101<H>  4.9   |  33<H>  |  1.5    Ca    8.5      04 Nov 2019 06:19  Mg     1.8     11-04      RADIOLOGY & ADDITIONAL TESTS:    < from: Transthoracic Echocardiogram (10.27.19 @ 15:56) >    Summary:   1. Left ventricular ejection fraction, by visual estimation, is 25 to   30%.   2. Severely decreased global left ventricular systolic function.   3. Multiple left ventricular regional wall motion abnormalities exist.   See wall motion findings.   4. Elevated left atrial and left ventricular end-diastolic pressures.   5. Mildly increased left ventricular internal cavity size.   6. Spectral Doppler shows restrictive pattern of left ventricular   myocardial filling (Grade III diastolic dysfunction).   7. Moderately enlarged right ventricle.   8. Moderately reduced RV systolic function.   9. Moderately enlarged right atrium.    10. Moderate to severe mitral valve regurgitation.  11. LA volume Index is 50.0 ml/m² ml/m2.      < from: 12 Lead ECG (10.25.19 @ 23:30) >  Ventricular Rate 159 BPM    Atrial Rate 166 BPM    QRS Duration 100 ms    Q-T Interval 248 ms    QTC Calculation(Bezet) 403 ms    R Axis 101 degrees    T Axis -51 degrees    Diagnosis Line Atrial fibrillation with rapid ventricular response  Rightward axis  Nonspecific T wave abnormality  Abnormal ECG    < end of copied text >    < end of copied text >

## 2019-11-04 NOTE — DISCHARGE NOTE NURSING/CASE MANAGEMENT/SOCIAL WORK - PATIENT PORTAL LINK FT
You can access the FollowMyHealth Patient Portal offered by [facility] by registering at the following website: [email or web address]. By joining [email or web address]’s FollowMyHealth portal, you will also be able to view your health information using other applications (apps) compatible with our system.

## 2019-11-04 NOTE — CONSULT NOTE ADULT - SUBJECTIVE AND OBJECTIVE BOX
Patient is a 54y old  Male who presents with a chief complaint of AF with RVR (04 Nov 2019 12:11)    HPI:  53 y/o M w/ PMHx morbid obesity, afib/flutter in the past s/p ablation has not been on blood thinners since ablation p/w SOB for 3 weeks. Patient noted for the last 3 weeks he has had increased BUCKLEY. Endorses chest tightness when he is short of breath. Recently seen at urgent care and diagnosed with cellulitis of bilateral lower ext and treated with antibiotics. Patient also reports being treated for 'congestion of the lung' where he was coughing and producing sputum. Was given a course of doxycycline after which he reports he feels better with improvement in his coughing and sputum production. Currently denies coughing, shortness of breath, chest pain, nausea, vomiting, diarrhea. (26 Oct 2019 05:29)      PAST MEDICAL & SURGICAL HISTORY:  Atrial fibrillation and flutter  History of prostate surgery  S/P ablation of atrial fibrillation  H/O skin graft      Hospital Course:   fib RVR-porrly controlled with runs of VTACH  -CV contraindicated secondary to LA thrombus, AC for 6 weeks then follow up  - cardizem switched to 240mg daily  - metoprolol 50q6, may switch to long acting on discharge  - apixaban 5 mg BID  - Strated digoxin loading, then 0.25 daily  -f/u EP for optimal rate control meds  -Mg>2 K>4    # SOB- likely tchycardia induced CM  - elevated D-dimer 786 on presentation  - due to patients body habitus cannot go for CT angio  - lower ext duplex no evidence of DVT  - ECHO showed EF 25 - 30 % with grade 3 diastolic dysfunction   -rate control w/ cardizem and metoprolol    #Left atrial thrombus  -anticoagulation with eliquis  -f/u in 6 weeks with EP    #Hypomagnesemia  -Suplement as needed  -PO mag oxide  TODAY'S SUBJECTIVE & REVIEW OF SYMPTOMS:     Constitutional Weakness   Cardio WNL   Resp SOB   GI WNL  Heme WNL  Endo WNL  Skin WNL  MSK arthritis  Neuro WNL  Cognitive WNL  Psych WNL      MEDICATIONS  (STANDING):  apixaban 5 milliGRAM(s) Oral every 12 hours  digoxin     Tablet 0.25 milliGRAM(s) Oral daily  digoxin  Injectable 0.25 milliGRAM(s) IV Push every 8 hours  diltiazem    milliGRAM(s) Oral daily  furosemide    Tablet 40 milliGRAM(s) Oral daily  hydrocortisone 1% Ointment 1 Application(s) Topical once  magnesium oxide 400 milliGRAM(s) Oral three times a day with meals  metolazone 2.5 milliGRAM(s) Oral <User Schedule>  metoprolol tartrate 50 milliGRAM(s) Oral every 6 hours  senna 1 Tablet(s) Oral daily    MEDICATIONS  (PRN):  bisacodyl 5 milliGRAM(s) Oral every 12 hours PRN Constipation  ondansetron Injectable 4 milliGRAM(s) IV Push once PRN Nausea and/or Vomiting  oxycodone    5 mG/acetaminophen 325 mG 2 Tablet(s) Oral every 4 hours PRN Moderate Pain (4 - 6)      FAMILY HISTORY:  FH: myocardial infarction: in father at age 60      Allergies    Augmentin (Other)  clindamycin (Other)  penicillins (Other)    Intolerances        SOCIAL HISTORY:    [    ] Etoh  [    ] Smoking  [    ] Substance abuse     Home Environment:  [    ] Home Alone  [  x  ] Lives with Family  [    ] Home Health Aid    Dwelling:  [    ] Apartment  [ x   ] Private House  [    ] Adult Home  [    ] Skilled Nursing Facility      [    ] Short Term  [    ] Long Term  [   x ] Stairs                           [    ] Elevator     FUNCTIONAL STATUS PTA: (Check all that apply)  Ambulation: [   x  ]Independent    [    ] Dependent     [    ] Non-Ambulatory  Assistive Device: [  x  ] SA Cane  [    ]  Q Cane  [    ] Walker  [    ]  Wheelchair  ADL : [  x  ] Independent  [    ]  Dependent       Vital Signs Last 24 Hrs  T(C): 36.4 (04 Nov 2019 13:16), Max: 36.4 (04 Nov 2019 13:16)  T(F): 97.6 (04 Nov 2019 13:16), Max: 97.6 (04 Nov 2019 13:16)  HR: 90 (04 Nov 2019 13:16) (90 - 122)  BP: 153/68 (04 Nov 2019 13:16) (118/59 - 153/68)  BP(mean): --  RR: 20 (04 Nov 2019 13:16) (19 - 20)  SpO2: --      PHYSICAL EXAM: Alert & Oriented X3 Morbidly obese  GENERAL: NAD, well-groomed, well-developed  HEAD:  Atraumatic, Normocephalic  EYES: EOMI, PERRLA, conjunctiva and sclera clear  NECK: Supple  CHEST/LUNG: Clear bilaterally  HEART: irregirreg  ABDOMEN: Soft, Nontender, Nondistended; Bowel sounds present  EXTREMITIES:  no calf tenderness,no edema BLES stasis changes distally    NERVOUS SYSTEM:  Cranial Nerves 2-12 intact [ x   ] Abnormal  [    ]  ROM: WFL all extremities [  x  ]  Abnormal [     ]  Motor Strength: WFL all extremities  [  x  ]  Abnormal [    ]3-4/5  Sensation: intact to light touch [  x  ] Abnormal [    ]      FUNCTIONAL STATUS:  Bed Mobility: [   ]  Independent [    ]  Supervision [  x  ]  Needs Assistance [  ]  N/A  Transfers: [    ]  Independent [    ]  Supervision [  x  ]  Needs Assistance [    ]  N/A    Ambulation:  [    ]  Independent [    ]  Supervision [ x   ]  Needs Assistance [    ]  N/A   ADL:  [    ]   Independent [  x  ] Requires Assistance [    ] N/A   +BUCKLEY    LABS:                        13.4   8.47  )-----------( 186      ( 04 Nov 2019 06:19 )             43.7     11-04    139  |  92<L>  |  26<H>  ----------------------------<  101<H>  4.9   |  33<H>  |  1.5    Ca    8.5      04 Nov 2019 06:19  Mg     1.8     11-04            RADIOLOGY & ADDITIONAL STUDIES:

## 2019-11-04 NOTE — PROGRESS NOTE ADULT - SUBJECTIVE AND OBJECTIVE BOX
ADAL FRENCH  54y Male    CHIEF COMPLAINT:    Patient is a 54y old  Male who presents with a chief complaint of AF with RVR (2019 12:11)      INTERVAL HPI/OVERNIGHT EVENTS:    Patient seen and examined. Remains tachycardiac. No palpitations. No sob. Loading with Dig today.     ROS: All other systems are negative.    Vital Signs:    T(F): 97.4 (19 @ 06:00), Max: 97.4 (19 @ 06:00)  HR: 105 (19 @ 06:00) (95 - 122)  BP: 118/59 (19 @ 06:00) (118/59 - 130/62)  RR: 20 (19 @ 06:00) (19 - 20)  SpO2: --  I&O's Summary    2019 07:01  -  2019 07:00  --------------------------------------------------------  IN: 300 mL / OUT: 2101 mL / NET: -1801 mL      Daily     Daily Weight in k.9 (2019 06:00)  CAPILLARY BLOOD GLUCOSE          PHYSICAL EXAM:    GENERAL:  NAD  SKIN: No rashes or lesions  HENT: Atraumatic Normocephalic. PERRL. Moist membranes.  NECK: Supple, No JVD. No lymphadenopathy.  PULMONARY: CTA B/L. No wheezing. No rales  CVS: Normal S1, S2. Rate and Rhythm are IRIR. No murmurs.  ABDOMEN/GI: Soft, Nontender, Nondistended; BS present  EXTREMITIES: Peripheral pulses intact. Trace edema B/L LE.  NEUROLOGIC:  No motor or sensory deficit.  PSYCH: Alert & oriented x 3    Consultant(s) Notes Reviewed:  [x ] YES  [ ] NO  Care Discussed with Consultants/Other Providers [ x] YES  [ ] NO    EKG reviewed  Telemetry reviewed    LABS:                        13.4   8.47  )-----------( 186      ( 2019 06:19 )             43.7     11-04    139  |  92<L>  |  26<H>  ----------------------------<  101<H> Creatinine Trend: 1.5<--, 1.3<--, 1.4<--, 1.3<--, 1.4<--, 1.5<--  4.9   |  33<H>  |  1.5    Ca    8.5      2019 06:19  Mg     1.8     11-04                RADIOLOGY & ADDITIONAL TESTS:      Imaging or report Personally Reviewed:  [ ] YES  [ ] NO    Medications:  Standing  apixaban 5 milliGRAM(s) Oral every 12 hours  digoxin     Tablet 0.25 milliGRAM(s) Oral daily  digoxin  Injectable 0.25 milliGRAM(s) IV Push every 8 hours  diltiazem    milliGRAM(s) Oral daily  furosemide    Tablet 40 milliGRAM(s) Oral daily  hydrocortisone 1% Ointment 1 Application(s) Topical once  magnesium oxide 400 milliGRAM(s) Oral three times a day with meals  metolazone 2.5 milliGRAM(s) Oral <User Schedule>  metoprolol tartrate 50 milliGRAM(s) Oral every 6 hours  senna 1 Tablet(s) Oral daily    PRN Meds  bisacodyl 5 milliGRAM(s) Oral every 12 hours PRN  ondansetron Injectable 4 milliGRAM(s) IV Push once PRN  oxycodone    5 mG/acetaminophen 325 mG 2 Tablet(s) Oral every 4 hours PRN      Case discussed with resident    Care discussed with pt/family

## 2019-11-04 NOTE — CONSULT NOTE ADULT - ASSESSMENT
IMPRESSION: Rehab of Debilitation     PRECAUTIONS: [   x ] Cardiac  [  x  ] Respiratory  [    ] Seizures [    ] Contact Isolation  [    ] Droplet Isolation  [    ] Other    Weight Bearing Status:     RECOMMENDATION:  OOB                                   STAFF TO AMBULATE WITH HEAVY DUTY WALKER IN ROOM  Out of Bed to Chair     DVT/Decubiti Prophylaxis    REHAB PLAN:     [  x   ] Bedside P/T 3-5 times a week   [     ] Bedside O/T  2-3 times a week   [     ] No Rehab Therapy Indicated   [     ]  Speech Therapy   Conditioning/ROM                                 ADL  Bed Mobility                                            Conditioning/ROM  Transfers                                                  Bed Mobility  Sitting /Standing Balance                      Transfers                                        Gait Training                                            Sitting/Standing Balance  Stair Training [   x]Applicable                 Home equipment Eval                                                                     Splinting  [   ] Only      GOALS:   ADL   [  x  ]   Independent         Transfers  [ x   ] Independent            Ambulation  [    x ] Independent     [    x ] With device                            [    ]  CG                                               [    ]  CG                                                    [     ] CG                            [    ] Min A                                          [    ] Min A                                                [     ] Min  A          DISCHARGE PLAN:   [     ]  Good candidate for Intensive Rehabilitation/Hospital based                                             Will tolerate 3hrs Intensive Rehab Daily                                       [      ]  Short Term Rehab in Skilled Nursing Facility                                       [  x    ]  Home with Outpatient or  services REFUSES SNF- HEAVY DUTY WALKER ON DC- NEEDS STAIR TRAINING                                         [      ]  Possible Candidate for Intensive Hospital based Rehab

## 2019-11-04 NOTE — PROGRESS NOTE ADULT - ASSESSMENT
Impression;  JERALD thrombus  AF with RVR    Plan:  - Start digoxin for better rate control  - con't tele  - Continue Metoprolol 50mg q6h, convert to long acting prior to discharge  - Cont cardizem 240 CD daily  - oob to evaluate rate response  - Maintain electrolytes K>4.0 Mg >2.0 to avoid ectopy  - con't eliquis.

## 2019-11-04 NOTE — PROGRESS NOTE ADULT - ASSESSMENT
53 y/o M w/ PMHx morbid obesity, afib/flutter in the past s/p ablation has not been on blood thinners since ablation p/w SOB for 3 weeks. Patient also states that for the last 3 weeks he has had increased BUCKLEY.    1.	A-Fib/Flutter with RVR  2.	Lt. Atrial appendage thrombus.  3.	Ac. HFrEF  4.	CM (EF 25-30%) likely tachycardia induced.   5.	Morbid obesity  6.	GONZALEZ on CKD-3         PLAN:    ·	Care D/W the EP. Recommended to load with digoxin.  ·	Cont Metoprolol 50 mg po q 6h and Diltiazem 60 mg po q 6h  ·	CV was cancelled as pt was found to have clot in LA appendage.   ·	EP recommended to cont Eliquis for six weeks and F/U with them as an out pt to reschedule for CV  ·	Cont Lasix 40 mg po daily  ·	Renal function improving. Follow BMP  ·	Check i's and o's and daily wt  ·	Low salt diet and water restriction to 1.5 L/D  ·	Will hold on starting ACE-I due to GONZALEZ   ·	ECHO reviewed. EF 25-30%. Multiple wall motion abnormalities.            PROGRESS NOTE HANDOFF:    ·	HR not controlled yet  ·	Care D/W wife.  ·	Disposition: Home when HR is controlled.

## 2019-11-04 NOTE — PROGRESS NOTE ADULT - SUBJECTIVE AND OBJECTIVE BOX
ADAL FRENCH 54y Male  MRN#: [id number]     SUBJECTIVE  Patient is a 54y old Male who presents with a chief complaint of AF with RVR (04 Nov 2019 12:11)  Today is hospital day 9d, and this morning he is lying in bed without distress.   No acute overnight events.   feels better each day  no chest pain  no SOB  no palpitation  able to ambulate with PT      OBJECTIVE  PAST MEDICAL & SURGICAL HISTORY  Atrial fibrillation and flutter  History of prostate surgery  S/P ablation of atrial fibrillation  H/O skin graft    ALLERGIES:  Augmentin (Other)  clindamycin (Other)  penicillins (Other)    MEDICATIONS:  STANDING MEDICATIONS  apixaban 5 milliGRAM(s) Oral every 12 hours  digoxin     Tablet 0.25 milliGRAM(s) Oral daily  digoxin  Injectable 0.25 milliGRAM(s) IV Push every 8 hours  diltiazem    milliGRAM(s) Oral daily  furosemide    Tablet 40 milliGRAM(s) Oral daily  hydrocortisone 1% Ointment 1 Application(s) Topical once  magnesium oxide 400 milliGRAM(s) Oral three times a day with meals  metolazone 2.5 milliGRAM(s) Oral <User Schedule>  metoprolol tartrate 50 milliGRAM(s) Oral every 6 hours  senna 1 Tablet(s) Oral daily    PRN MEDICATIONS  bisacodyl 5 milliGRAM(s) Oral every 12 hours PRN  ondansetron Injectable 4 milliGRAM(s) IV Push once PRN  oxycodone    5 mG/acetaminophen 325 mG 2 Tablet(s) Oral every 4 hours PRN    HOME MEDICATIONS  Home Medications:      VITAL SIGNS: Last 24 Hours  T(C): 36.3 (04 Nov 2019 06:00), Max: 36.3 (04 Nov 2019 06:00)  T(F): 97.4 (04 Nov 2019 06:00), Max: 97.4 (04 Nov 2019 06:00)  HR: 105 (04 Nov 2019 06:00) (95 - 122)  BP: 118/59 (04 Nov 2019 06:00) (118/59 - 130/62)  BP(mean): --  RR: 20 (04 Nov 2019 06:00) (19 - 20)  SpO2: --    11-03-19 @ 07:01  -  11-04-19 @ 07:00  --------------------------------------------------------  IN: 300 mL / OUT: 2101 mL / NET: -1801 mL    11-04-19 @ 07:01  -  11-04-19 @ 13:16  --------------------------------------------------------  IN: 880 mL / OUT: 0 mL / NET: 880 mL        LABS:                        13.4   8.47  )-----------( 186      ( 04 Nov 2019 06:19 )             43.7     11-04    139  |  92<L>  |  26<H>  ----------------------------<  101<H>  4.9   |  33<H>  |  1.5    Ca    8.5      04 Nov 2019 06:19  Mg     1.8     11-04                      CAPILLARY BLOOD GLUCOSE          RADIOLOGY:    PHYSICAL EXAM:  PHYSICAL EXAM:  GENERAL: NAD, AAO x 4, 54y M  CHEST/LUNG: Clear to auscultation bilaterally; No wheeze; No crackles; No accessory muscles used  HEART: irregular  ABDOMEN: Soft, Nontender, Nondistended; Bowel sounds present; No guarding obese  EXTREMITIES:  2+  edema bilateral lower extremities with chronic changes seen on skin      ADMISSION SUMMARY  Patient is a 54y old Male who presents with a chief complaint of AF with RVR (04 Nov 2019 12:11)

## 2019-11-04 NOTE — PROGRESS NOTE ADULT - ASSESSMENT
53 y/o M w/ PMHx morbid obesity, afib/flutter in the past s/p ablation has not been on blood thinners since ablation p/w SOB for 3 weeks    # Afib RVR-porrly controlled with runs of VTACH  -CV contraindicated secondary to LA thrombus, AC for 6 weeks then follow up  - cardizem switched to 240mg daily  - metoprolol 50q6, may switch to long acting on discharge  - apixaban 5 mg BID  - Strated digoxin loading, then 0.25 daily  -f/u EP for optimal rate control meds  -Mg>2 K>4    # SOB- likely tchycardia induced CM  - elevated D-dimer 786 on presentation  - due to patients body habitus cannot go for CT angio  - lower ext duplex no evidence of DVT  - ECHO showed EF 25 - 30 % with grade 3 diastolic dysfunction   -rate control w/ cardizem and metoprolol    #Left atrial thrombus  -anticoagulation with eliquis  -f/u in 6 weeks with EP    #Hypomagnesemia  -Suplement as needed  -PO mag oxide    #GONZALEZ likely cardiorenal  - resolved  - no known history of CKD  - prerenal vs cardiorenal  -Judious with starting ACEi as Gonzalez and digoxin toxicity concerns    # MARTIN/OHS?  - needs sleep study as an outpatient     #Morbid obesity  -BMI 82.5  - Bariatric surgery evaluation  - Patient considering bariatric surgery as outpatient   -dietary recs appreciated    #Diet: Regular  #Activity: ambulate with assistance  #Dispo: Pending clinical improvemetn

## 2019-11-05 LAB
ANION GAP SERPL CALC-SCNC: 11 MMOL/L — SIGNIFICANT CHANGE UP (ref 7–14)
BASOPHILS # BLD AUTO: 0.1 K/UL — SIGNIFICANT CHANGE UP (ref 0–0.2)
BASOPHILS NFR BLD AUTO: 1.1 % — HIGH (ref 0–1)
BUN SERPL-MCNC: 24 MG/DL — HIGH (ref 10–20)
CALCIUM SERPL-MCNC: 8.7 MG/DL — SIGNIFICANT CHANGE UP (ref 8.5–10.1)
CHLORIDE SERPL-SCNC: 92 MMOL/L — LOW (ref 98–110)
CO2 SERPL-SCNC: 33 MMOL/L — HIGH (ref 17–32)
CREAT SERPL-MCNC: 1.3 MG/DL — SIGNIFICANT CHANGE UP (ref 0.7–1.5)
EOSINOPHIL # BLD AUTO: 0.31 K/UL — SIGNIFICANT CHANGE UP (ref 0–0.7)
EOSINOPHIL NFR BLD AUTO: 3.4 % — SIGNIFICANT CHANGE UP (ref 0–8)
GLUCOSE SERPL-MCNC: 102 MG/DL — HIGH (ref 70–99)
HCT VFR BLD CALC: 43.3 % — SIGNIFICANT CHANGE UP (ref 42–52)
HGB BLD-MCNC: 13.6 G/DL — LOW (ref 14–18)
IMM GRANULOCYTES NFR BLD AUTO: 0.4 % — HIGH (ref 0.1–0.3)
LYMPHOCYTES # BLD AUTO: 0.65 K/UL — LOW (ref 1.2–3.4)
LYMPHOCYTES # BLD AUTO: 7.2 % — LOW (ref 20.5–51.1)
MAGNESIUM SERPL-MCNC: 1.7 MG/DL — LOW (ref 1.8–2.4)
MCHC RBC-ENTMCNC: 27.9 PG — SIGNIFICANT CHANGE UP (ref 27–31)
MCHC RBC-ENTMCNC: 31.4 G/DL — LOW (ref 32–37)
MCV RBC AUTO: 88.7 FL — SIGNIFICANT CHANGE UP (ref 80–94)
MONOCYTES # BLD AUTO: 1.07 K/UL — HIGH (ref 0.1–0.6)
MONOCYTES NFR BLD AUTO: 11.8 % — HIGH (ref 1.7–9.3)
NEUTROPHILS # BLD AUTO: 6.92 K/UL — HIGH (ref 1.4–6.5)
NEUTROPHILS NFR BLD AUTO: 76.1 % — HIGH (ref 42.2–75.2)
NRBC # BLD: 0 /100 WBCS — SIGNIFICANT CHANGE UP (ref 0–0)
PLATELET # BLD AUTO: 163 K/UL — SIGNIFICANT CHANGE UP (ref 130–400)
POTASSIUM SERPL-MCNC: 4.3 MMOL/L — SIGNIFICANT CHANGE UP (ref 3.5–5)
POTASSIUM SERPL-SCNC: 4.3 MMOL/L — SIGNIFICANT CHANGE UP (ref 3.5–5)
RBC # BLD: 4.88 M/UL — SIGNIFICANT CHANGE UP (ref 4.7–6.1)
RBC # FLD: 15.7 % — HIGH (ref 11.5–14.5)
SODIUM SERPL-SCNC: 136 MMOL/L — SIGNIFICANT CHANGE UP (ref 135–146)
WBC # BLD: 9.09 K/UL — SIGNIFICANT CHANGE UP (ref 4.8–10.8)
WBC # FLD AUTO: 9.09 K/UL — SIGNIFICANT CHANGE UP (ref 4.8–10.8)

## 2019-11-05 PROCEDURE — 99233 SBSQ HOSP IP/OBS HIGH 50: CPT

## 2019-11-05 RX ORDER — KETOCONAZOLE 20 MG/G
1 AEROSOL, FOAM TOPICAL
Refills: 0 | Status: DISCONTINUED | OUTPATIENT
Start: 2019-11-05 | End: 2019-11-05

## 2019-11-05 RX ORDER — MAGNESIUM SULFATE 500 MG/ML
2 VIAL (ML) INJECTION ONCE
Refills: 0 | Status: COMPLETED | OUTPATIENT
Start: 2019-11-05 | End: 2019-11-05

## 2019-11-05 RX ADMIN — Medication 0.25 MILLIGRAM(S): at 02:56

## 2019-11-05 RX ADMIN — Medication 0.25 MILLIGRAM(S): at 06:53

## 2019-11-05 RX ADMIN — Medication 50 MILLIGRAM(S): at 17:07

## 2019-11-05 RX ADMIN — OXYCODONE AND ACETAMINOPHEN 2 TABLET(S): 5; 325 TABLET ORAL at 18:18

## 2019-11-05 RX ADMIN — Medication 50 MILLIGRAM(S): at 00:45

## 2019-11-05 RX ADMIN — Medication 1 APPLICATION(S): at 17:07

## 2019-11-05 RX ADMIN — APIXABAN 5 MILLIGRAM(S): 2.5 TABLET, FILM COATED ORAL at 06:52

## 2019-11-05 RX ADMIN — NYSTATIN CREAM 1 APPLICATION(S): 100000 CREAM TOPICAL at 06:54

## 2019-11-05 RX ADMIN — Medication 25 GRAM(S): at 13:29

## 2019-11-05 RX ADMIN — SENNA PLUS 1 TABLET(S): 8.6 TABLET ORAL at 11:24

## 2019-11-05 RX ADMIN — MAGNESIUM OXIDE 400 MG ORAL TABLET 400 MILLIGRAM(S): 241.3 TABLET ORAL at 17:07

## 2019-11-05 RX ADMIN — MAGNESIUM OXIDE 400 MG ORAL TABLET 400 MILLIGRAM(S): 241.3 TABLET ORAL at 07:55

## 2019-11-05 RX ADMIN — OXYCODONE AND ACETAMINOPHEN 2 TABLET(S): 5; 325 TABLET ORAL at 11:21

## 2019-11-05 RX ADMIN — MAGNESIUM OXIDE 400 MG ORAL TABLET 400 MILLIGRAM(S): 241.3 TABLET ORAL at 11:26

## 2019-11-05 RX ADMIN — Medication 50 MILLIGRAM(S): at 11:24

## 2019-11-05 RX ADMIN — Medication 50 MILLIGRAM(S): at 06:52

## 2019-11-05 RX ADMIN — Medication 50 MILLIGRAM(S): at 23:13

## 2019-11-05 RX ADMIN — Medication 240 MILLIGRAM(S): at 06:52

## 2019-11-05 RX ADMIN — OXYCODONE AND ACETAMINOPHEN 2 TABLET(S): 5; 325 TABLET ORAL at 11:51

## 2019-11-05 RX ADMIN — Medication 100 MILLIGRAM(S): at 07:58

## 2019-11-05 RX ADMIN — NYSTATIN CREAM 1 APPLICATION(S): 100000 CREAM TOPICAL at 06:52

## 2019-11-05 RX ADMIN — OXYCODONE AND ACETAMINOPHEN 2 TABLET(S): 5; 325 TABLET ORAL at 03:48

## 2019-11-05 RX ADMIN — OXYCODONE AND ACETAMINOPHEN 2 TABLET(S): 5; 325 TABLET ORAL at 23:13

## 2019-11-05 RX ADMIN — APIXABAN 5 MILLIGRAM(S): 2.5 TABLET, FILM COATED ORAL at 17:07

## 2019-11-05 RX ADMIN — OXYCODONE AND ACETAMINOPHEN 2 TABLET(S): 5; 325 TABLET ORAL at 03:18

## 2019-11-05 RX ADMIN — OXYCODONE AND ACETAMINOPHEN 2 TABLET(S): 5; 325 TABLET ORAL at 17:14

## 2019-11-05 NOTE — PROGRESS NOTE ADULT - ASSESSMENT
55 y/o M w/ PMHx morbid obesity, afib/flutter in the past s/p ablation has not been on blood thinners since ablation p/w SOB for 3 weeks    # Afib RVR-poorly controlled with runs of VTACH  -CV contraindicated secondary to LA thrombus, AC for 6 weeks then follow up  - cardizem switched to 240mg daily  - metoprolol 50q6, may switch to long acting on discharge  - apixaban 5 mg BID  - Strated digoxin loading, then 0.25 daily  -f/u EP for optimal rate control meds  -Mg>2 K>4    #Back lesions fungal with possible overlying cellulits vs possible allergic reaction  -Nystatin  -DOxycyclin  -benadryl prn  -ID consult    # SOB- likely tchycardia induced CM  - elevated D-dimer 786 on presentation  - due to patients body habitus cannot go for CT angio  - lower ext duplex no evidence of DVT  - ECHO showed EF 25 - 30 % with grade 3 diastolic dysfunction   -rate control w/ cardizem and metoprolol    #Left atrial thrombus  -anticoagulation with eliquis  -f/u in 6 weeks with EP    #Hypomagnesemia  -Suplement as needed  -PO mag oxide    #GONZALEZ likely cardiorenal  - resolved  - no known history of CKD  - prerenal vs cardiorenal  -Judious with starting ACEi as Gonzalez and digoxin toxicity concerns    # MARTIN/OHS?  - needs sleep study as an outpatient     #Morbid obesity  -BMI 82.5  - Bariatric surgery evaluation  - Patient considering bariatric surgery as outpatient   -dietary recs appreciated    #Diet: Regular  #Activity: ambulate with assistance  #Dispo: Pending clinical improvemetn

## 2019-11-05 NOTE — PROGRESS NOTE ADULT - ASSESSMENT
55 y/o M w/ PMHx morbid obesity, afib/flutter in the past s/p ablation has not been on blood thinners since ablation p/w SOB for 3 weeks. Patient also states that for the last 3 weeks he has had increased BUCKLEY.    1.	A-Fib/Flutter with RVR  2.	Lt. Atrial appendage thrombus.  3.	Ac. HFrEF  4.	CM (EF 25-30%) likely tachycardia induced.   5.	Morbid obesity  6.	GONZALEZ on CKD-3  7.	Disseminated fungal infection of the skin         PLAN:    ·	S/P loading dose of Digoxin and now on digoxin 0.25 mg po daily  ·	Cont Metoprolol 50 mg po q 6h and Diltiazem 60 mg po q 6h  ·	CV was cancelled as pt was found to have clot in LA appendage.   ·	EP recommended to cont Eliquis for six weeks and F/U with them as an out pt to reschedule for CV  ·	Cont Lasix 40 mg po daily and Metolazone 0.025 mg daily  ·	Renal function improving. Follow BMP  ·	Check i's and o's and daily wt  ·	Low salt diet and water restriction to 1.5 L/D  ·	Will hold on starting ACE-I due to GONZALEZ   ·	ECHO reviewed. EF 25-30%. Multiple wall motion abnormalities.   ·	ID eval noted. D/C Doxycycline and Hydrocortisone cream  ·	Apply Nizoral cream to rash twice daily           PROGRESS NOTE HANDOFF:    ·	HR not controlled yet  ·	Care D/W wife about treatment plan  ·	Disposition: Home when HR is controlled.

## 2019-11-05 NOTE — PROGRESS NOTE ADULT - SUBJECTIVE AND OBJECTIVE BOX
ADAL FRENCH  54y Male    CHIEF COMPLAINT:    Patient is a 54y old  Male who presents with a chief complaint of Cellulitis (2019 09:01)      INTERVAL HPI/OVERNIGHT EVENTS:    Patient seen and examined. Became tachycardia on exertion and HR went up to 135. Rash has spread from groins to back, legs and chest. No sob. No palpitations.     ROS: All other systems are negative.    Vital Signs:    T(F): 98 (19 @ 05:37), Max: 98.3 (19 @ 20:22)  HR: 101 (19 @ 11:16) (90 - 107)  BP: 128/75 (19 @ 11:16) (123/71 - 159/86)  RR: 18 (19 @ 05:37) (18 - 20)  SpO2: --  I&O's Summary    2019 07:  -  2019 07:00  --------------------------------------------------------  IN: 1240 mL / OUT: 3700 mL / NET: -2460 mL    2019 07:  -  2019 11:36  --------------------------------------------------------  IN: 350 mL / OUT: 1200 mL / NET: -850 mL      Daily     Daily Weight in k (2019 05:37)  CAPILLARY BLOOD GLUCOSE          PHYSICAL EXAM:    GENERAL:  NAD  SKIN: A maculopapular rash on chest, back, legs and groins  HENT: Atraumatic Normocephalic. PERRL. Moist membranes.  NECK: Supple, No JVD. No lymphadenopathy.  PULMONARY: CTA B/L. No wheezing. No rales  CVS: Normal S1, S2. Rate and Rhythm are regular. No murmurs.  ABDOMEN/GI: Soft, Nontender, Nondistended; BS present  EXTREMITIES: Peripheral pulses intact. 2+ pitting edema B/L LE. Ulcers and maculopapular rash on legs.   NEUROLOGIC:  No motor or sensory deficit.  PSYCH: Alert & oriented x 3    Consultant(s) Notes Reviewed:  [x ] YES  [ ] NO  Care Discussed with Consultants/Other Providers [ x] YES  [ ] NO    EKG reviewed  Telemetry reviewed    LABS:                        13.6   9.09  )-----------( 163      ( 2019 05:29 )             43.3     11-05    136  |  92<L>  |  24<H>  ----------------------------<  102<H>   Creatinine Trend: 1.3<--, 1.5<--, 1.3<--, 1.4<--, 1.3<--, 1.4<--  4.3   |  33<H>  |  1.3    Ca    8.7      2019 05:29  Mg     1.7     11-05                RADIOLOGY & ADDITIONAL TESTS:      Imaging or report Personally Reviewed:  [ ] YES  [ ] NO    Medications:  Standing  apixaban 5 milliGRAM(s) Oral every 12 hours  digoxin     Tablet 0.25 milliGRAM(s) Oral daily  diltiazem    milliGRAM(s) Oral daily  doxycycline hyclate Capsule 100 milliGRAM(s) Oral every 12 hours  furosemide    Tablet 40 milliGRAM(s) Oral daily  hydrocortisone 1% Ointment 1 Application(s) Topical once  magnesium oxide 400 milliGRAM(s) Oral three times a day with meals  metolazone 2.5 milliGRAM(s) Oral <User Schedule>  metoprolol tartrate 50 milliGRAM(s) Oral every 6 hours  senna 1 Tablet(s) Oral daily    PRN Meds  bisacodyl 5 milliGRAM(s) Oral every 12 hours PRN  diphenhydrAMINE 50 milliGRAM(s) Oral every 6 hours PRN  ondansetron Injectable 4 milliGRAM(s) IV Push once PRN  oxycodone    5 mG/acetaminophen 325 mG 2 Tablet(s) Oral every 4 hours PRN      Case discussed with resident    Care discussed with pt/family

## 2019-11-05 NOTE — CONSULT NOTE ADULT - SUBJECTIVE AND OBJECTIVE BOX
ADAL FRENCH  54y, Male  Allergy: Augmentin (Other)  clindamycin (Other)  penicillins (Other)      CHIEF COMPLAINT: AF with RVR (04 Nov 2019 12:11)      HPI:  53 y/o M w/ PMHx morbid obesity, afib/flutter in the past s/p ablation has not been on blood thinners since ablation p/w SOB for 3 weeks. Patient noted for the last 3 weeks he has had increased BUCKLEY. Endorses chest tightness when he is short of breath. Recently seen at urgent care and diagnosed with cellulitis of bilateral lower ext and treated with antibiotics. Patient also reports being treated for 'congestion of the lung' where he was coughing and producing sputum. Was given a course of doxycycline after which he reports he feels better with improvement in his coughing and sputum production. Currently denies coughing, shortness of breath, chest pain, nausea, vomiting, diarrhea. (26 Oct 2019 05:29)    FAMILY HISTORY:  FH: myocardial infarction: in father at age 60    PAST MEDICAL & SURGICAL HISTORY:  Atrial fibrillation and flutter  History of prostate surgery  S/P ablation of atrial fibrillation  H/O skin graft      Substance Use ( x ) never used  (  ) IVDU (  ) Other:  Tobacco Usage:  ( x  ) never smoked   (   ) former smoker   (   ) current smoker   Alcohol Usage: (  x ) social  (   ) daily use (   ) denies  Sexual History: na      ROS  General: Denies fevers, chills, nightsweats, weight loss  HEENT: Denies headache, rhinorrhea, sore throat, eye pain  CV: Denies CP, palpitations  PULM: Denies SOB, cough  GI: Denies abdominal pain, diarrhea  : Denies dysuria, hematuria  MSK: Denies arthralgias  SKIN: Denies rash   NEURO: Denies paresthesias, weakness  PSYCH: Denies depression    VITALS:  T(F): 98, Max: 98.3 (11-04-19 @ 20:22)  HR: 94  BP: 123/71  RR: 18Vital Signs Last 24 Hrs  T(C): 36.7 (05 Nov 2019 05:37), Max: 36.8 (04 Nov 2019 20:22)  T(F): 98 (05 Nov 2019 05:37), Max: 98.3 (04 Nov 2019 20:22)  HR: 94 (05 Nov 2019 05:37) (90 - 107)  BP: 123/71 (05 Nov 2019 05:37) (123/71 - 159/86)  BP(mean): --  RR: 18 (05 Nov 2019 05:37) (18 - 20)  SpO2: --    PHYSICAL EXAM:  Gen: NAD, resting in bed  HEENT: Normocephalic, atraumatic  Neck: supple, no lymphadenopathy  CV: Regular rate & regular rhythm  Lungs: decreased BS at bases, no fremitus  Abdomen: Soft, BS present  Ext: Warm, well perfused  Neuro: non focal, awake  Skin: no rash, no erythema    TESTS & MEASUREMENTS:                        13.6   9.09  )-----------( 163      ( 05 Nov 2019 05:29 )             43.3     11-05    136  |  92<L>  |  24<H>  ----------------------------<  102<H>  4.3   |  33<H>  |  1.3    Ca    8.7      05 Nov 2019 05:29  Mg     1.7     11-05      eGFR if Non African American: 62 mL/min/1.73M2 (11-05-19 @ 05:29)  eGFR if African American: 72 mL/min/1.73M2 (11-05-19 @ 05:29)                INFECTIOUS DISEASES TESTING      RADIOLOGY & ADDITIONAL TESTS:  I have personally reviewed the last Chest xray  CXR      CT      CARDIOLOGY TESTING  Transthoracic Echocardiogram:    EXAM:  2-D ECHO (TTE) COMPLETE        PROCEDURE DATE:  10/27/2019      INTERPRETATION:  REPORT:    TRANSTHORACIC ECHOCARDIOGRAM REPORT         Patient Name:   ADAL FRENCH Accession #: [id number]  Medical Rec #:  [id number]      Height:      71.0 in 180.3 cm  YOB: 1965     Weight:      574.0 lb 260.36 kg  Patient Age:    54 years      BSA:         3.30 m²  Patient Gender: M             BP:          130/82 mmHg       Date of Exam:        10/27/2019 3:56:05 PM  Referring Physician: [id number] CARLEY CHUNG  Sonographer:         CA  Reading Physician:   Grady Lord MD.    Procedure:     2D Echo/Doppler/Color Doppler Complete.  Indications:   R06.00 - Dyspnea, unspecified  Diagnosis:     Dyspnea, unspecified - R06.00  Study Details: Technically difficult study. Study quality was adversely   affected                 due to patient obesity and the patient being uncooperative.         Summary:   1. Left ventricular ejection fraction, by visual estimation, is 25 to   30%.   2. Severely decreased global left ventricular systolic function.   3. Multiple left ventricular regional wall motion abnormalities exist.   See wall motion findings.   4. Elevated left atrial and left ventricular end-diastolic pressures.   5. Mildly increased left ventricular internal cavity size.   6. Spectral Doppler shows restrictive pattern of left ventricular   myocardial filling (Grade III diastolic dysfunction).   7. Moderately enlarged right ventricle.   8. Moderately reduced RV systolic function.   9. Moderately enlarged right atrium.  10. Moderate to severe mitral valve regurgitation.  11. LA volume Index is 50.0 ml/m² ml/m2.    PHYSICIAN INTERPRETATION:  Left Ventricle: The left ventricle was not well visualized. The left   ventricular internal cavity size is mildly increased. Left ventricular   wall thickness is normal. Global LV systolic function was severely   decreased. Left ventricular ejection fraction, by visual estimation, is   25 to 30%. Spectral Doppler shows restrictive pattern of left ventricular   myocardial filling (Grade III diastolic dysfunction). Elevated left   atrial and left ventricular end-diastolic pressures.       LV Wall Scoring:  There is diffuse hypokinesis.    Right Ventricle: The right ventricle was not well visualized. The right   ventricular size is moderately enlarged. RV wall thickness is normal. RV   systolic function is moderately reduced.  Left Atrium: Severely enlarged left atrium. LA volume Index is 50.0 ml/m²   ml/m2.  Right Atrium: Moderately enlarged right atrium.  Pericardium: There is no evidence of pericardial effusion.  Mitral Valve: Structurally normal mitral valve, with normal leaflet   excursion. The mitral valve is not well seen. The mitral valve is normal   in structure. Moderate to severe mitral valve regurgitation is seen.  Tricuspid Valve: Structurally normal tricuspid valve, with normal leaflet   excursion. The tricuspid valve is not well seen. The tricuspid valve is   normal in structure. Mild tricuspid regurgitation is visualized.  Aortic Valve: Normal trileaflet aortic valve with normal opening. The   aortic valve was not well visualized. The aortic valve is normal. No   evidence of aortic valve regurgitation is seen.  Pulmonic Valve: Structurally normalpulmonic valve, with normal leaflet   excursion. The pulmonic valve was not well visualized. The pulmonic valve   is normal. No indication of pulmonic valve regurgitation.  Aorta: The aortic root and ascending aorta are structurally normal, with   noevidence of dilitation. The aorta was not well visualized.  Pulmonary Artery: The main pulmonary artery is normal in size. The   pulmonary artery is not well seen.  Venous: The inferior vena cava is not well visualized. The inferior vena   cava was dilated, with respiratory size variation less than 50%.       2D AND M-MODE MEASUREMENTS (normal ranges within parentheses):  Left                  Normal   Aorta/Left             Normal  Ventricle:                     Atrium:  IVSd (2D):  0.96 cm  (0.7-1.1) AoV Cusp       1.57  (1.5-2.6)  LVPWd (2D): 1.18 cm  (0.7-1.1) Separation:     cm  LVIDd (2D): 5.80 cm  (3.4-5.7) Left Atrium    4.27  (1.9-4.0)  LVIDs (2D): 4.73 cm            (Mmode):        cm  LV FS (2D):  18.5 %   (>25%)   LA Volume      50.0  Relative      0.41    (<0.42)  Index         ml/m²  Wall                           Right  Thickness                      Ventricle:                                 RVd (2D):      3.80 cm    SPECTRAL DOPPLER ANALYSIS:  LV DIASTOLIC FUNCTION:  MV Peak E: 1.10 m/s Decel Time: 117 msec  MV Peak A: 0.28 m/s  E/A Ratio: 3.86    Aortic Valve:  AoV VMax:    1.03 m/s AoV Area, Vmax: 4.62 cm² Vmax Indx: 1.40 cm²/m²  AoV Pk Grad: 4.3 mmHg    LVOT Vmax: 0.76 m/s  LVOT VTI:  0.11 m  LVOT Diam: 2.83 cm    Mitral Valve:  MV P1/2 Time: 33.86 msec  MV Area, PHT: 6.50 cm²    Tricuspid Valve and PA/RV Systolic Pressure: TR Max Velocity: 2.01 m/s RA   Pressure: 15 mmHg RVSP/PASP: 31.2 mmHg       [id number] Grady Lord MD, Electronically signed on 10/27/2019at   8:17:11 PM              *** Final ***                    GRADY LORD MD  This document has been electronically signed. Oct 27 2019  3:56PM             (10-27-19 @ 15:56)  12 Lead ECG:   Ventricular Rate 159 BPM    Atrial Rate 166 BPM    QRS Duration 100 ms    Q-T Interval 248 ms    QTC Calculation(Bezet) 403 ms    R Axis 101 degrees    T Axis -51 degrees    Diagnosis Line Atrial fibrillation with rapid ventricular response  Rightward axis  Nonspecific T wave abnormality  Abnormal ECG    Confirmed by GRADY LORD MD ([id number]) on 10/26/2019 10:59:12 AM (10-25-19 @ 23:30)      MEDICATIONS  apixaban 5  digoxin     Tablet 0.25  diltiazem     doxycycline hyclate Capsule 100  furosemide    Tablet 40  hydrocortisone 1% Ointment 1  magnesium oxide 400  metolazone 2.5  metoprolol tartrate 50  nystatin Cream 1  nystatin Powder 1  senna 1      ANTIBIOTICS:  doxycycline hyclate Capsule 100 milliGRAM(s) Oral every 12 hours      All available historical data has been reviewed

## 2019-11-05 NOTE — PROGRESS NOTE ADULT - SUBJECTIVE AND OBJECTIVE BOX
ADAL FRENCH 54y Male  MRN#: [id number]     SUBJECTIVE  Patient is a 54y old Male who presents with a chief complaint of AF with RVR (04 Nov 2019 12:11)  Today is hospital day 10d, and this morning he is lying in bed without distress.   No acute overnight events.   Feels well but com,plains of itching on his back  no chrst pajn no SOB  no f/c/n/v  has some pruritis relieved with benadryl    OBJECTIVE  PAST MEDICAL & SURGICAL HISTORY  Atrial fibrillation and flutter  History of prostate surgery  S/P ablation of atrial fibrillation  H/O skin graft    ALLERGIES:  Augmentin (Other)  clindamycin (Other)  penicillins (Other)    MEDICATIONS:  STANDING MEDICATIONS  apixaban 5 milliGRAM(s) Oral every 12 hours  digoxin     Tablet 0.25 milliGRAM(s) Oral daily  diltiazem    milliGRAM(s) Oral daily  doxycycline hyclate Capsule 100 milliGRAM(s) Oral every 12 hours  furosemide    Tablet 40 milliGRAM(s) Oral daily  hydrocortisone 1% Ointment 1 Application(s) Topical once  magnesium oxide 400 milliGRAM(s) Oral three times a day with meals  metolazone 2.5 milliGRAM(s) Oral <User Schedule>  metoprolol tartrate 50 milliGRAM(s) Oral every 6 hours  nystatin Cream 1 Application(s) Topical every 12 hours  nystatin Powder 1 Application(s) Topical two times a day  senna 1 Tablet(s) Oral daily    PRN MEDICATIONS  bisacodyl 5 milliGRAM(s) Oral every 12 hours PRN  diphenhydrAMINE 50 milliGRAM(s) Oral every 6 hours PRN  ondansetron Injectable 4 milliGRAM(s) IV Push once PRN  oxycodone    5 mG/acetaminophen 325 mG 2 Tablet(s) Oral every 4 hours PRN    HOME MEDICATIONS  Home Medications:      VITAL SIGNS: Last 24 Hours  T(C): 36.7 (05 Nov 2019 05:37), Max: 36.8 (04 Nov 2019 20:22)  T(F): 98 (05 Nov 2019 05:37), Max: 98.3 (04 Nov 2019 20:22)  HR: 94 (05 Nov 2019 05:37) (90 - 107)  BP: 123/71 (05 Nov 2019 05:37) (123/71 - 159/86)  BP(mean): --  RR: 18 (05 Nov 2019 05:37) (18 - 20)  SpO2: --    11-04-19 @ 07:01  -  11-05-19 @ 07:00  --------------------------------------------------------  IN: 1240 mL / OUT: 3700 mL / NET: -2460 mL        LABS:                        13.6   9.09  )-----------( 163      ( 05 Nov 2019 05:29 )             43.3     11-05    136  |  92<L>  |  24<H>  ----------------------------<  102<H>  4.3   |  33<H>  |  1.3    Ca    8.7      05 Nov 2019 05:29  Mg     1.7     11-05                      CAPILLARY BLOOD GLUCOSE          RADIOLOGY:    PHYSICAL EXAM:  PHYSICAL EXAM:  GENERAL: NAD, AAO x 4, 54y M, obese  CHEST/LUNG: Clear to auscultation bilaterally; No wheeze; No crackles; No accessory muscles used  HEART: irregular  ABDOMEN: Soft, Nontender, Nondistended; Bowel sounds present; No guarding  EXTREMITIES:  2+ pitting edema bilateral lower extremities   Back: Multiple nonblanching raised lesion, no increased warmth    ADMISSION SUMMARY  Patient is a 54y old Male who presents with a chief complaint of AF with RVR (04 Nov 2019 12:11)

## 2019-11-05 NOTE — CONSULT NOTE ADULT - ASSESSMENT
ASSESSMENT  54y M admitted with ATRIAL FIBRILLATION WITH RVR      PROBLEMS  1. Cellulitis    New problem with additional W/U  acute illness with systemic symptoms      ON doxycycline hyclate Capsule 100 milliGRAM(s) Oral every 12 hours  Allergy: Augmentin (Other)  clindamycin (Other)  penicillins (Other)    Morbid Obesity BMI>40    PLAN  Correct weight units (lbs) in banner to get correct BMI ASSESSMENT  54y M admitted with ATRIAL FIBRILLATION WITH RVR      PROBLEMS  1. Fungal lesions back & intertriginous groin spaces    New problem with additional W/U  acute illness with systemic symptoms      ON doxycycline hyclate Capsule 100 milliGRAM(s) Oral every 12 hours  Allergy: Augmentin (Other)  clindamycin (Other)  penicillins (Other)    Morbid Obesity BMI>80    PLAN  D/C Doxycycline  Nizoral Cream q12h to back & groin

## 2019-11-06 VITALS — SYSTOLIC BLOOD PRESSURE: 128 MMHG | DIASTOLIC BLOOD PRESSURE: 89 MMHG | HEART RATE: 104 BPM

## 2019-11-06 LAB
ANION GAP SERPL CALC-SCNC: 14 MMOL/L — SIGNIFICANT CHANGE UP (ref 7–14)
BASOPHILS # BLD AUTO: 0.06 K/UL — SIGNIFICANT CHANGE UP (ref 0–0.2)
BASOPHILS NFR BLD AUTO: 0.8 % — SIGNIFICANT CHANGE UP (ref 0–1)
BUN SERPL-MCNC: 22 MG/DL — HIGH (ref 10–20)
CALCIUM SERPL-MCNC: 8.6 MG/DL — SIGNIFICANT CHANGE UP (ref 8.5–10.1)
CHLORIDE SERPL-SCNC: 96 MMOL/L — LOW (ref 98–110)
CO2 SERPL-SCNC: 30 MMOL/L — SIGNIFICANT CHANGE UP (ref 17–32)
CREAT SERPL-MCNC: 1.3 MG/DL — SIGNIFICANT CHANGE UP (ref 0.7–1.5)
EOSINOPHIL # BLD AUTO: 0.46 K/UL — SIGNIFICANT CHANGE UP (ref 0–0.7)
EOSINOPHIL NFR BLD AUTO: 6.2 % — SIGNIFICANT CHANGE UP (ref 0–8)
GLUCOSE SERPL-MCNC: 93 MG/DL — SIGNIFICANT CHANGE UP (ref 70–99)
HCT VFR BLD CALC: 43.6 % — SIGNIFICANT CHANGE UP (ref 42–52)
HGB BLD-MCNC: 13.4 G/DL — LOW (ref 14–18)
IMM GRANULOCYTES NFR BLD AUTO: 0.4 % — HIGH (ref 0.1–0.3)
LYMPHOCYTES # BLD AUTO: 1.1 K/UL — LOW (ref 1.2–3.4)
LYMPHOCYTES # BLD AUTO: 14.9 % — LOW (ref 20.5–51.1)
MAGNESIUM SERPL-MCNC: 1.9 MG/DL — SIGNIFICANT CHANGE UP (ref 1.8–2.4)
MCHC RBC-ENTMCNC: 27.8 PG — SIGNIFICANT CHANGE UP (ref 27–31)
MCHC RBC-ENTMCNC: 30.7 G/DL — LOW (ref 32–37)
MCV RBC AUTO: 90.5 FL — SIGNIFICANT CHANGE UP (ref 80–94)
MONOCYTES # BLD AUTO: 0.98 K/UL — HIGH (ref 0.1–0.6)
MONOCYTES NFR BLD AUTO: 13.3 % — HIGH (ref 1.7–9.3)
NEUTROPHILS # BLD AUTO: 4.75 K/UL — SIGNIFICANT CHANGE UP (ref 1.4–6.5)
NEUTROPHILS NFR BLD AUTO: 64.4 % — SIGNIFICANT CHANGE UP (ref 42.2–75.2)
NRBC # BLD: 0 /100 WBCS — SIGNIFICANT CHANGE UP (ref 0–0)
PLATELET # BLD AUTO: 155 K/UL — SIGNIFICANT CHANGE UP (ref 130–400)
POTASSIUM SERPL-MCNC: 4.2 MMOL/L — SIGNIFICANT CHANGE UP (ref 3.5–5)
POTASSIUM SERPL-SCNC: 4.2 MMOL/L — SIGNIFICANT CHANGE UP (ref 3.5–5)
RBC # BLD: 4.82 M/UL — SIGNIFICANT CHANGE UP (ref 4.7–6.1)
RBC # FLD: 15.7 % — HIGH (ref 11.5–14.5)
SODIUM SERPL-SCNC: 140 MMOL/L — SIGNIFICANT CHANGE UP (ref 135–146)
WBC # BLD: 7.38 K/UL — SIGNIFICANT CHANGE UP (ref 4.8–10.8)
WBC # FLD AUTO: 7.38 K/UL — SIGNIFICANT CHANGE UP (ref 4.8–10.8)

## 2019-11-06 PROCEDURE — 99239 HOSP IP/OBS DSCHRG MGMT >30: CPT

## 2019-11-06 RX ORDER — DIGOXIN 250 MCG
1 TABLET ORAL
Qty: 0 | Refills: 0 | DISCHARGE
Start: 2019-11-06

## 2019-11-06 RX ORDER — FUROSEMIDE 40 MG
1 TABLET ORAL
Qty: 0 | Refills: 0 | DISCHARGE
Start: 2019-11-06

## 2019-11-06 RX ORDER — DILTIAZEM HCL 120 MG
1 CAPSULE, EXT RELEASE 24 HR ORAL
Qty: 30 | Refills: 2
Start: 2019-11-06 | End: 2020-02-03

## 2019-11-06 RX ORDER — KETOCONAZOLE 20 MG/G
1 AEROSOL, FOAM TOPICAL
Qty: 500 | Refills: 0
Start: 2019-11-06 | End: 2019-12-05

## 2019-11-06 RX ORDER — FUROSEMIDE 40 MG
1 TABLET ORAL
Qty: 30 | Refills: 0
Start: 2019-11-06 | End: 2019-12-05

## 2019-11-06 RX ORDER — LISINOPRIL 2.5 MG/1
1 TABLET ORAL
Qty: 30 | Refills: 2
Start: 2019-11-06 | End: 2020-02-03

## 2019-11-06 RX ORDER — METOPROLOL TARTRATE 50 MG
1 TABLET ORAL
Qty: 30 | Refills: 2
Start: 2019-11-06 | End: 2020-02-03

## 2019-11-06 RX ORDER — FLUCONAZOLE 150 MG/1
1 TABLET ORAL
Qty: 3 | Refills: 0
Start: 2019-11-06 | End: 2019-11-08

## 2019-11-06 RX ORDER — DIGOXIN 250 MCG
1 TABLET ORAL
Qty: 30 | Refills: 0
Start: 2019-11-06 | End: 2019-12-05

## 2019-11-06 RX ORDER — APIXABAN 2.5 MG/1
1 TABLET, FILM COATED ORAL
Qty: 60 | Refills: 0
Start: 2019-11-06 | End: 2019-12-05

## 2019-11-06 RX ORDER — DILTIAZEM HCL 120 MG
1 CAPSULE, EXT RELEASE 24 HR ORAL
Qty: 0 | Refills: 0 | DISCHARGE
Start: 2019-11-06

## 2019-11-06 RX ORDER — MAGNESIUM OXIDE 400 MG ORAL TABLET 241.3 MG
1 TABLET ORAL
Qty: 21 | Refills: 0
Start: 2019-11-06 | End: 2019-11-12

## 2019-11-06 RX ADMIN — Medication 1 APPLICATION(S): at 06:28

## 2019-11-06 RX ADMIN — Medication 50 MILLIGRAM(S): at 17:06

## 2019-11-06 RX ADMIN — OXYCODONE AND ACETAMINOPHEN 2 TABLET(S): 5; 325 TABLET ORAL at 01:05

## 2019-11-06 RX ADMIN — Medication 0.25 MILLIGRAM(S): at 06:28

## 2019-11-06 RX ADMIN — MAGNESIUM OXIDE 400 MG ORAL TABLET 400 MILLIGRAM(S): 241.3 TABLET ORAL at 11:33

## 2019-11-06 RX ADMIN — MAGNESIUM OXIDE 400 MG ORAL TABLET 400 MILLIGRAM(S): 241.3 TABLET ORAL at 17:06

## 2019-11-06 RX ADMIN — Medication 40 MILLIGRAM(S): at 06:28

## 2019-11-06 RX ADMIN — MAGNESIUM OXIDE 400 MG ORAL TABLET 400 MILLIGRAM(S): 241.3 TABLET ORAL at 07:54

## 2019-11-06 RX ADMIN — APIXABAN 5 MILLIGRAM(S): 2.5 TABLET, FILM COATED ORAL at 17:06

## 2019-11-06 RX ADMIN — SENNA PLUS 1 TABLET(S): 8.6 TABLET ORAL at 11:33

## 2019-11-06 RX ADMIN — Medication 1 APPLICATION(S): at 17:07

## 2019-11-06 RX ADMIN — Medication 50 MILLIGRAM(S): at 06:28

## 2019-11-06 RX ADMIN — OXYCODONE AND ACETAMINOPHEN 2 TABLET(S): 5; 325 TABLET ORAL at 07:24

## 2019-11-06 RX ADMIN — Medication 50 MILLIGRAM(S): at 11:33

## 2019-11-06 RX ADMIN — APIXABAN 5 MILLIGRAM(S): 2.5 TABLET, FILM COATED ORAL at 06:28

## 2019-11-06 RX ADMIN — OXYCODONE AND ACETAMINOPHEN 2 TABLET(S): 5; 325 TABLET ORAL at 06:54

## 2019-11-06 RX ADMIN — Medication 240 MILLIGRAM(S): at 06:28

## 2019-11-06 NOTE — PROGRESS NOTE ADULT - SUBJECTIVE AND OBJECTIVE BOX
ADAL FRENCH  54y Male    CHIEF COMPLAINT:    Patient is a 54y old  Male who presents with a chief complaint of Cellulitis (2019 09:01)      INTERVAL HPI/OVERNIGHT EVENTS:    Patient seen and examined. Feels much better. No palpitations. No sob. No dizziness.    ROS: All other systems are negative.    Vital Signs:    T(F): 97 (19 @ 05:54), Max: 98.3 (19 @ 13:09)  HR: 79 (19 @ 11:28) (79 - 104)  BP: 143/72 (19 @ 11:28) (124/72 - 143/72)  RR: 18 (19 @ 05:54) (18 - 18)  SpO2: --  I&O's Summary    2019 07:01  -  2019 07:00  --------------------------------------------------------  IN: 800 mL / OUT: 3300 mL / NET: -2500 mL      Daily     Daily Weight in k (2019 05:54)  CAPILLARY BLOOD GLUCOSE          PHYSICAL EXAM:    GENERAL:  NAD  SKIN: Fungal rash all over the body  HENT: Atraumatic Normocephalic. PERRL. Moist membranes.  NECK: Supple, No JVD. No lymphadenopathy.  PULMONARY: CTA B/L. No wheezing. No rales  CVS: Normal S1, S2. Rate and Rhythm are regular. No murmurs.  ABDOMEN/GI: Soft, Nontender, Nondistended; BS present  EXTREMITIES: Peripheral pulses intact. 1+ pitting edema B/L LE.  NEUROLOGIC:  No motor or sensory deficit.  PSYCH: Alert & oriented x 3    Consultant(s) Notes Reviewed:  [x ] YES  [ ] NO  Care Discussed with Consultants/Other Providers [ x] YES  [ ] NO    EKG reviewed  Telemetry reviewed    LABS:                        13.4   7.38  )-----------( 155      ( 2019 06:10 )             43.6     11-06    140  |  96<L>  |  22<H>  ----------------------------<  93   Creatinine Trend: 1.3<--, 1.3<--, 1.5<--, 1.3<--, 1.4<--, 1.3<--  4.2   |  30  |  1.3    Ca    8.6      2019 06:10  Mg     1.9     -                RADIOLOGY & ADDITIONAL TESTS:      Imaging or report Personally Reviewed:  [ ] YES  [ ] NO    Medications:  Standing  apixaban 5 milliGRAM(s) Oral every 12 hours  clotrimazole 1% Cream 1 Application(s) Topical every 12 hours  digoxin     Tablet 0.25 milliGRAM(s) Oral daily  diltiazem    milliGRAM(s) Oral daily  furosemide    Tablet 40 milliGRAM(s) Oral daily  magnesium oxide 400 milliGRAM(s) Oral three times a day with meals  metolazone 2.5 milliGRAM(s) Oral <User Schedule>  metoprolol tartrate 50 milliGRAM(s) Oral every 6 hours  senna 1 Tablet(s) Oral daily    PRN Meds  bisacodyl 5 milliGRAM(s) Oral every 12 hours PRN  diphenhydrAMINE 50 milliGRAM(s) Oral every 6 hours PRN  ondansetron Injectable 4 milliGRAM(s) IV Push once PRN  oxycodone    5 mG/acetaminophen 325 mG 2 Tablet(s) Oral every 4 hours PRN      Case discussed with resident    Care discussed with pt/family

## 2019-11-06 NOTE — PROGRESS NOTE ADULT - PROVIDER SPECIALTY LIST ADULT
Electrophysiology
Hospitalist
Infectious Disease
Internal Medicine
Electrophysiology
Hospitalist
Electrophysiology

## 2019-11-06 NOTE — PROGRESS NOTE ADULT - ASSESSMENT
53 y/o M w/ PMHx morbid obesity, afib/flutter in the past s/p ablation has not been on blood thinners since ablation p/w SOB for 3 weeks. Patient also states that for the last 3 weeks he has had increased BUCKLEY.    1.	A-Fib/Flutter with RVR  2.	Lt. Atrial appendage thrombus.  3.	Ac. HFrEF  4.	CM (EF 25-30%) likely tachycardia induced.   5.	Morbid obesity  6.	GONZALEZ on CKD-3  7.	Disseminated fungal infection of the skin         PLAN:    ·	Cont digoxin 0.25 mg po daily  ·	Switch him to Metoprolol Succinate 200 mg po daily and Cardizem  mg po daily  ·	CV was cancelled as pt was found to have clot in LA appendage.   ·	EP recommended to cont Eliquis for six weeks and F/U with them as an out pt to reschedule for CV  ·	Cont Lasix 40 mg po daily and Metolazone 0.025 mg daily  ·	Renal function stable  ·	Low salt diet and water restriction to 1.5 L/D  ·	Will start him on Lisinopril 2.5 mg po daily  ·	ECHO reviewed. EF 25-30%. Multiple wall motion abnormalities.   ·	ID F/U for fungal rash. After ID recommendations can D/C him home.       * Med rec reviewed. Plan of care D/W the pt and his wife. Time spent 42 minutes.

## 2019-11-06 NOTE — PROGRESS NOTE ADULT - ASSESSMENT
Assessment: 55 yo M with morbid obesity, AFib sp ablation admitted for SOB. Pt found to have JERALD clot during admission and had AFib with RVR. Dig was started and patients HR appear controlled now at rest. Pt asymptomatic at this time.     Plan:  JERALD Clot  AFib RVR  CM EF 25-30%  - Cont Digoxin  - Cont Metoprolol 50 mg Q6H  - Cont Cardizem 240 CD QD  - Walk patient today to assess HR with exertion, if remains controlled can D/C home from EP standpoint on Dig, Metoprolol and Cardizem and follow up with Dr Redd as outpatient  - Plan for afib ablation in the future, to be set up as outpatient  - Cont tele monitoring  - Cont Eliquis  - Will d/w attending

## 2019-11-06 NOTE — PROGRESS NOTE ADULT - ASSESSMENT
ASSESSMENT  54y M admitted with ATRIAL FIBRILLATION WITH RVR      PROBLEMS  1. Fungal lesions back & intertriginous groin spaces  New problem with additional W/U  acute illness with systemic symptoms      ON doxycycline hyclate Capsule 100 milliGRAM(s) Oral every 12 hours  Allergy: Augmentin (Other)  clindamycin (Other)  penicillins (Other)    Morbid Obesity BMI>80    PLAN  This is a pended note, all recommendations to follow.   Nizoral Cream q12h to back & groin ASSESSMENT  54y M admitted with ATRIAL FIBRILLATION WITH RVR      PROBLEMS  1. Fungal lesions back & intertriginous groin spaces  New problem with additional W/U  acute illness with systemic symptoms      ON doxycycline hyclate Capsule 100 milliGRAM(s) Oral every 12 hours  Allergy: Augmentin (Other)  clindamycin (Other)  penicillins (Other)    Morbid Obesity BMI>80    PLAN  Fluc 200mg PO q24h x 3  Nizoral Cream q12h to back & groin

## 2019-11-06 NOTE — PROGRESS NOTE ADULT - SUBJECTIVE AND OBJECTIVE BOX
FRENCHADAL  54y, Male  Allergy: Augmentin (Other)  clindamycin (Other)  penicillins (Other)      CHIEF COMPLAINT: Cellulitis (05 Nov 2019 09:01)      INTERVAL EVENTS/HPI  - No acute events overnight  - T(F): , Max: 97.7 (11-05-19 @ 17:00)  - Denies any worsening symptoms  - Tolerating medication  - WBC Count: 7.38 (11-06-19 @ 06:10)  WBC Count: 9.09 (11-05-19 @ 05:29)  - Creatinine, Serum: 1.3 (11-06-19 @ 06:10)  Creatinine, Serum: 1.3 (11-05-19 @ 05:29)       ROS  General: Denies rigors, nightsweats  HEENT: Denies headache, rhinorrhea, sore throat, eye pain  CV: Denies CP, palpitations  PULM: Denies wheezing, hemoptysis  GI: Denies hematemesis, hematochezia, melena  : Denies discharge, hematuria  MSK: Denies arthralgias, myalgias  SKIN: Denies rash, lesions  NEURO: Denies paresthesias, weakness  PSYCH: Denies depression, anxiety    VITALS:  T(F): 97.7, Max: 97.7 (11-05-19 @ 17:00)  HR: 97  BP: 134/66  RR: 18Vital Signs Last 24 Hrs  T(C): 36.5 (06 Nov 2019 12:38), Max: 36.5 (05 Nov 2019 17:00)  T(F): 97.7 (06 Nov 2019 12:38), Max: 97.7 (05 Nov 2019 17:00)  HR: 97 (06 Nov 2019 12:38) (79 - 104)  BP: 134/66 (06 Nov 2019 12:38) (134/66 - 143/72)  BP(mean): --  RR: 18 (06 Nov 2019 12:38) (18 - 18)  SpO2: --    PHYSICAL EXAM:  Gen: NAD, resting in bed  HEENT: Normocephalic, atraumatic  Neck: supple, no lymphadenopathy  CV: Regular rate & regular rhythm  Lungs: decreased BS at bases, no fremitus  Abdomen: Soft, BS present  Ext: Warm, well perfused  Neuro: non focal, awake  Skin: no rash, no erythema  Lines: no phlebitis    FH: Non-contributory  Social Hx: Non-contributory    TESTS & MEASUREMENTS:                        13.4   7.38  )-----------( 155      ( 06 Nov 2019 06:10 )             43.6     11-06    140  |  96<L>  |  22<H>  ----------------------------<  93  4.2   |  30  |  1.3    Ca    8.6      06 Nov 2019 06:10  Mg     1.9     11-06      eGFR if Non African American: 62 mL/min/1.73M2 (11-06-19 @ 06:10)  eGFR if : 72 mL/min/1.73M2 (11-06-19 @ 06:10)                INFECTIOUS DISEASES TESTING      RADIOLOGY & ADDITIONAL TESTS:  I have personally reviewed the last Chest xray  CXR      CT      CARDIOLOGY TESTING  Transthoracic Echocardiogram:    EXAM:  2-D ECHO (TTE) COMPLETE        PROCEDURE DATE:  10/27/2019      INTERPRETATION:  REPORT:    TRANSTHORACIC ECHOCARDIOGRAM REPORT         Patient Name:   ADAL FRENCH Accession #: [id number]  Medical Rec #:  [id number]      Height:      71.0 in 180.3 cm  YOB: 1965     Weight:      574.0 lb 260.36 kg  Patient Age:    54 years      BSA:         3.30 m²  Patient Gender: M             BP:          130/82 mmHg       Date of Exam:        10/27/2019 3:56:05 PM  Referring Physician: [id number] CARLEY CHUNG  Sonographer:         CA  Reading Physician:   Grady Lord MD.    Procedure:     2D Echo/Doppler/Color Doppler Complete.  Indications:   R06.00 - Dyspnea, unspecified  Diagnosis:     Dyspnea, unspecified - R06.00  Study Details: Technically difficult study. Study quality was adversely   affected                 due to patient obesity and the patient being uncooperative.         Summary:   1. Left ventricular ejection fraction, by visual estimation, is 25 to   30%.   2. Severely decreased global left ventricular systolic function.   3. Multiple left ventricular regional wall motion abnormalities exist.   See wall motion findings.   4. Elevated left atrial and left ventricular end-diastolic pressures.   5. Mildly increased left ventricular internal cavity size.   6. Spectral Doppler shows restrictive pattern of left ventricular   myocardial filling (Grade III diastolic dysfunction).   7. Moderately enlarged right ventricle.   8. Moderately reduced RV systolic function.   9. Moderately enlarged right atrium.  10. Moderate to severe mitral valve regurgitation.  11. LA volume Index is 50.0 ml/m² ml/m2.    PHYSICIAN INTERPRETATION:  Left Ventricle: The left ventricle was not well visualized. The left   ventricular internal cavity size is mildly increased. Left ventricular   wall thickness is normal. Global LV systolic function was severely   decreased. Left ventricular ejection fraction, by visual estimation, is   25 to 30%. Spectral Doppler shows restrictive pattern of left ventricular   myocardial filling (Grade III diastolic dysfunction). Elevated left   atrial and left ventricular end-diastolic pressures.       LV Wall Scoring:  There is diffuse hypokinesis.    Right Ventricle: The right ventricle was not well visualized. The right   ventricular size is moderately enlarged. RV wall thickness is normal. RV   systolic function is moderately reduced.  Left Atrium: Severely enlarged left atrium. LA volume Index is 50.0 ml/m²   ml/m2.  Right Atrium: Moderately enlarged right atrium.  Pericardium: There is no evidence of pericardial effusion.  Mitral Valve: Structurally normal mitral valve, with normal leaflet   excursion. The mitral valve is not well seen. The mitral valve is normal   in structure. Moderate to severe mitral valve regurgitation is seen.  Tricuspid Valve: Structurally normal tricuspid valve, with normal leaflet   excursion. The tricuspid valve is not well seen. The tricuspid valve is   normal in structure. Mild tricuspid regurgitation is visualized.  Aortic Valve: Normal trileaflet aortic valve with normal opening. The   aortic valve was not well visualized. The aortic valve is normal. No   evidence of aortic valve regurgitation is seen.  Pulmonic Valve: Structurally normalpulmonic valve, with normal leaflet   excursion. The pulmonic valve was not well visualized. The pulmonic valve   is normal. No indication of pulmonic valve regurgitation.  Aorta: The aortic root and ascending aorta are structurally normal, with   noevidence of dilitation. The aorta was not well visualized.  Pulmonary Artery: The main pulmonary artery is normal in size. The   pulmonary artery is not well seen.  Venous: The inferior vena cava is not well visualized. The inferior vena   cava was dilated, with respiratory size variation less than 50%.       2D AND M-MODE MEASUREMENTS (normal ranges within parentheses):  Left                  Normal   Aorta/Left             Normal  Ventricle:                     Atrium:  IVSd (2D):  0.96 cm  (0.7-1.1) AoV Cusp       1.57  (1.5-2.6)  LVPWd (2D): 1.18 cm  (0.7-1.1) Separation:     cm  LVIDd (2D): 5.80 cm  (3.4-5.7) Left Atrium    4.27  (1.9-4.0)  LVIDs (2D): 4.73 cm            (Mmode):        cm  LV FS (2D):  18.5 %   (>25%)   LA Volume      50.0  Relative      0.41    (<0.42)  Index         ml/m²  Wall                           Right  Thickness                      Ventricle:                                 RVd (2D):      3.80 cm    SPECTRAL DOPPLER ANALYSIS:  LV DIASTOLIC FUNCTION:  MV Peak E: 1.10 m/s Decel Time: 117 msec  MV Peak A: 0.28 m/s  E/A Ratio: 3.86    Aortic Valve:  AoV VMax:    1.03 m/s AoV Area, Vmax: 4.62 cm² Vmax Indx: 1.40 cm²/m²  AoV Pk Grad: 4.3 mmHg    LVOT Vmax: 0.76 m/s  LVOT VTI:  0.11 m  LVOT Diam: 2.83 cm    Mitral Valve:  MV P1/2 Time: 33.86 msec  MV Area, PHT: 6.50 cm²    Tricuspid Valve and PA/RV Systolic Pressure: TR Max Velocity: 2.01 m/s RA   Pressure: 15 mmHg RVSP/PASP: 31.2 mmHg       [id number] Grady Lord MD, Electronically signed on 10/27/2019at   8:17:11 PM              *** Final ***                    GRADY LORD MD  This document has been electronically signed. Oct 27 2019  3:56PM             (10-27-19 @ 15:56)  12 Lead ECG:   Ventricular Rate 159 BPM    Atrial Rate 166 BPM    QRS Duration 100 ms    Q-T Interval 248 ms    QTC Calculation(Bezet) 403 ms    R Axis 101 degrees    T Axis -51 degrees    Diagnosis Line Atrial fibrillation with rapid ventricular response  Rightward axis  Nonspecific T wave abnormality  Abnormal ECG    Confirmed by GRADY LORD MD ([id number]) on 10/26/2019 10:59:12 AM (10-25-19 @ 23:30)      MEDICATIONS  apixaban 5  clotrimazole 1% Cream 1  digoxin     Tablet 0.25  diltiazem     furosemide    Tablet 40  magnesium oxide 400  metolazone 2.5  metoprolol tartrate 50  senna 1      ANTIBIOTICS:      All available historical records have been reviewed FRENCHADAL  54y, Male  Allergy: Augmentin (Other)  clindamycin (Other)  penicillins (Other)      CHIEF COMPLAINT: Cellulitis (05 Nov 2019 09:01)      INTERVAL EVENTS/HPI  - No acute events overnight  - T(F): , Max: 97.7 (11-05-19 @ 17:00)  - Denies any worsening symptoms  - Tolerating medication  - WBC Count: 7.38 (11-06-19 @ 06:10)  WBC Count: 9.09 (11-05-19 @ 05:29)  - Creatinine, Serum: 1.3 (11-06-19 @ 06:10)  Creatinine, Serum: 1.3 (11-05-19 @ 05:29)       ROS  General: Denies rigors, nightsweats  HEENT: Denies headache, rhinorrhea, sore throat, eye pain  CV: Denies CP, palpitations  PULM: Denies wheezing, hemoptysis  GI: Denies hematemesis, hematochezia, melena  : Denies discharge, hematuria  MSK: Denies arthralgias, myalgias  SKIN: Denies rash, lesions  NEURO: Denies paresthesias, weakness  PSYCH: Denies depression, anxiety    VITALS:  T(F): 97.7, Max: 97.7 (11-05-19 @ 17:00)  HR: 97  BP: 134/66  RR: 18Vital Signs Last 24 Hrs  T(C): 36.5 (06 Nov 2019 12:38), Max: 36.5 (05 Nov 2019 17:00)  T(F): 97.7 (06 Nov 2019 12:38), Max: 97.7 (05 Nov 2019 17:00)  HR: 97 (06 Nov 2019 12:38) (79 - 104)  BP: 134/66 (06 Nov 2019 12:38) (134/66 - 143/72)  BP(mean): --  RR: 18 (06 Nov 2019 12:38) (18 - 18)  SpO2: --    PHYSICAL EXAM:  Gen: Obese, NAD, resting in bed  HEENT: Normocephalic, atraumatic  Neck: supple, no lymphadenopathy  CV: Regular rate & regular rhythm  Lungs: decreased BS at bases, no fremitus  Abdomen: Soft, BS present  Ext: Warm, well perfused  Neuro: non focal, awake  Skin: erythematous rash back, LE more streaky, forearms  Lines: no phlebitis    FH: Non-contributory  Social Hx: Non-contributory    TESTS & MEASUREMENTS:                        13.4   7.38  )-----------( 155      ( 06 Nov 2019 06:10 )             43.6     11-06    140  |  96<L>  |  22<H>  ----------------------------<  93  4.2   |  30  |  1.3    Ca    8.6      06 Nov 2019 06:10  Mg     1.9     11-06      eGFR if Non African American: 62 mL/min/1.73M2 (11-06-19 @ 06:10)  eGFR if : 72 mL/min/1.73M2 (11-06-19 @ 06:10)                INFECTIOUS DISEASES TESTING      RADIOLOGY & ADDITIONAL TESTS:  I have personally reviewed the last Chest xray  CXR      CT      CARDIOLOGY TESTING  Transthoracic Echocardiogram:    EXAM:  2-D ECHO (TTE) COMPLETE        PROCEDURE DATE:  10/27/2019      INTERPRETATION:  REPORT:    TRANSTHORACIC ECHOCARDIOGRAM REPORT         Patient Name:   ADAL FRENCH Accession #: [id number]  Medical Rec #:  [id number]      Height:      71.0 in 180.3 cm  YOB: 1965     Weight:      574.0 lb 260.36 kg  Patient Age:    54 years      BSA:         3.30 m²  Patient Gender: M             BP:          130/82 mmHg       Date of Exam:        10/27/2019 3:56:05 PM  Referring Physician: [id number] CARLEY CHUNG  Sonographer:         CA  Reading Physician:   Grady Lord MD.    Procedure:     2D Echo/Doppler/Color Doppler Complete.  Indications:   R06.00 - Dyspnea, unspecified  Diagnosis:     Dyspnea, unspecified - R06.00  Study Details: Technically difficult study. Study quality was adversely   affected                 due to patient obesity and the patient being uncooperative.         Summary:   1. Left ventricular ejection fraction, by visual estimation, is 25 to   30%.   2. Severely decreased global left ventricular systolic function.   3. Multiple left ventricular regional wall motion abnormalities exist.   See wall motion findings.   4. Elevated left atrial and left ventricular end-diastolic pressures.   5. Mildly increased left ventricular internal cavity size.   6. Spectral Doppler shows restrictive pattern of left ventricular   myocardial filling (Grade III diastolic dysfunction).   7. Moderately enlarged right ventricle.   8. Moderately reduced RV systolic function.   9. Moderately enlarged right atrium.  10. Moderate to severe mitral valve regurgitation.  11. LA volume Index is 50.0 ml/m² ml/m2.    PHYSICIAN INTERPRETATION:  Left Ventricle: The left ventricle was not well visualized. The left   ventricular internal cavity size is mildly increased. Left ventricular   wall thickness is normal. Global LV systolic function was severely   decreased. Left ventricular ejection fraction, by visual estimation, is   25 to 30%. Spectral Doppler shows restrictive pattern of left ventricular   myocardial filling (Grade III diastolic dysfunction). Elevated left   atrial and left ventricular end-diastolic pressures.       LV Wall Scoring:  There is diffuse hypokinesis.    Right Ventricle: The right ventricle was not well visualized. The right   ventricular size is moderately enlarged. RV wall thickness is normal. RV   systolic function is moderately reduced.  Left Atrium: Severely enlarged left atrium. LA volume Index is 50.0 ml/m²   ml/m2.  Right Atrium: Moderately enlarged right atrium.  Pericardium: There is no evidence of pericardial effusion.  Mitral Valve: Structurally normal mitral valve, with normal leaflet   excursion. The mitral valve is not well seen. The mitral valve is normal   in structure. Moderate to severe mitral valve regurgitation is seen.  Tricuspid Valve: Structurally normal tricuspid valve, with normal leaflet   excursion. The tricuspid valve is not well seen. The tricuspid valve is   normal in structure. Mild tricuspid regurgitation is visualized.  Aortic Valve: Normal trileaflet aortic valve with normal opening. The   aortic valve was not well visualized. The aortic valve is normal. No   evidence of aortic valve regurgitation is seen.  Pulmonic Valve: Structurally normalpulmonic valve, with normal leaflet   excursion. The pulmonic valve was not well visualized. The pulmonic valve   is normal. No indication of pulmonic valve regurgitation.  Aorta: The aortic root and ascending aorta are structurally normal, with   noevidence of dilitation. The aorta was not well visualized.  Pulmonary Artery: The main pulmonary artery is normal in size. The   pulmonary artery is not well seen.  Venous: The inferior vena cava is not well visualized. The inferior vena   cava was dilated, with respiratory size variation less than 50%.       2D AND M-MODE MEASUREMENTS (normal ranges within parentheses):  Left                  Normal   Aorta/Left             Normal  Ventricle:                     Atrium:  IVSd (2D):  0.96 cm  (0.7-1.1) AoV Cusp       1.57  (1.5-2.6)  LVPWd (2D): 1.18 cm  (0.7-1.1) Separation:     cm  LVIDd (2D): 5.80 cm  (3.4-5.7) Left Atrium    4.27  (1.9-4.0)  LVIDs (2D): 4.73 cm            (Mmode):        cm  LV FS (2D):  18.5 %   (>25%)   LA Volume      50.0  Relative      0.41    (<0.42)  Index         ml/m²  Wall                           Right  Thickness                      Ventricle:                                 RVd (2D):      3.80 cm    SPECTRAL DOPPLER ANALYSIS:  LV DIASTOLIC FUNCTION:  MV Peak E: 1.10 m/s Decel Time: 117 msec  MV Peak A: 0.28 m/s  E/A Ratio: 3.86    Aortic Valve:  AoV VMax:    1.03 m/s AoV Area, Vmax: 4.62 cm² Vmax Indx: 1.40 cm²/m²  AoV Pk Grad: 4.3 mmHg    LVOT Vmax: 0.76 m/s  LVOT VTI:  0.11 m  LVOT Diam: 2.83 cm    Mitral Valve:  MV P1/2 Time: 33.86 msec  MV Area, PHT: 6.50 cm²    Tricuspid Valve and PA/RV Systolic Pressure: TR Max Velocity: 2.01 m/s RA   Pressure: 15 mmHg RVSP/PASP: 31.2 mmHg       [id number] Grady Lord MD, Electronically signed on 10/27/2019at   8:17:11 PM              *** Final ***                    GRADY LORD MD  This document has been electronically signed. Oct 27 2019  3:56PM             (10-27-19 @ 15:56)  12 Lead ECG:   Ventricular Rate 159 BPM    Atrial Rate 166 BPM    QRS Duration 100 ms    Q-T Interval 248 ms    QTC Calculation(Bezet) 403 ms    R Axis 101 degrees    T Axis -51 degrees    Diagnosis Line Atrial fibrillation with rapid ventricular response  Rightward axis  Nonspecific T wave abnormality  Abnormal ECG    Confirmed by GRADY LORD MD ([id number]) on 10/26/2019 10:59:12 AM (10-25-19 @ 23:30)      MEDICATIONS  apixaban 5  clotrimazole 1% Cream 1  digoxin     Tablet 0.25  diltiazem     furosemide    Tablet 40  magnesium oxide 400  metolazone 2.5  metoprolol tartrate 50  senna 1      ANTIBIOTICS:      All available historical records have been reviewed

## 2019-11-06 NOTE — PROGRESS NOTE ADULT - SUBJECTIVE AND OBJECTIVE BOX
INTERVAL HPI/OVERNIGHT EVENTS: Episodes of tachycardia with exertion yesterday into 150s. Pt appears to have improving resting HR in 80s-90s and has been asymptomatic without SOB.    MEDICATIONS  (STANDING):  apixaban 5 milliGRAM(s) Oral every 12 hours  clotrimazole 1% Cream 1 Application(s) Topical every 12 hours  digoxin     Tablet 0.25 milliGRAM(s) Oral daily  diltiazem    milliGRAM(s) Oral daily  furosemide    Tablet 40 milliGRAM(s) Oral daily  magnesium oxide 400 milliGRAM(s) Oral three times a day with meals  metolazone 2.5 milliGRAM(s) Oral <User Schedule>  metoprolol tartrate 50 milliGRAM(s) Oral every 6 hours  senna 1 Tablet(s) Oral daily    MEDICATIONS  (PRN):  bisacodyl 5 milliGRAM(s) Oral every 12 hours PRN Constipation  diphenhydrAMINE 50 milliGRAM(s) Oral every 6 hours PRN Rash and/or Itching  ondansetron Injectable 4 milliGRAM(s) IV Push once PRN Nausea and/or Vomiting  oxycodone    5 mG/acetaminophen 325 mG 2 Tablet(s) Oral every 4 hours PRN Moderate Pain (4 - 6)      Allergies  Augmentin (Other)  clindamycin (Other)  penicillins (Other)    REVIEW OF SYSTEMS: Denies CP, SOB, dizziness or palpitations.    Vital Signs Last 24 Hrs  T(C): 36.1 (06 Nov 2019 05:54), Max: 36.8 (05 Nov 2019 13:09)  T(F): 97 (06 Nov 2019 05:54), Max: 98.3 (05 Nov 2019 13:09)  HR: 94 (06 Nov 2019 05:54) (88 - 104)  BP: 136/90 (06 Nov 2019 05:54) (124/72 - 137/98)  BP(mean): --  RR: 18 (06 Nov 2019 05:54) (18 - 18)  SpO2: --    11-05-19 @ 07:01  -  11-06-19 @ 07:00  --------------------------------------------------------  IN: 800 mL / OUT: 3300 mL / NET: -2500 mL        Physical Exam  GENERAL: In no apparent distress, well nourished, and hydrated.  HEART: Irregular rate and rhythm; No murmurs, rubs, or gallops.  PULMONARY: Clear to auscultation and perfusion.  No rales, wheezing, or rhonchi bilaterally.  ABDOMEN: Soft, Nontender, Nondistended; Bowel sounds present  EXTREMITIES:  2+ Peripheral Pulses, No clubbing, cyanosis, or edema  SKIN: Maculopapular rash to chest, back and extremities    LABS:                        13.4   7.38  )-----------( 155      ( 06 Nov 2019 06:10 )             43.6     11-06    140  |  96<L>  |  22<H>  ----------------------------<  93  4.2   |  30  |  1.3    Ca    8.6      06 Nov 2019 06:10  Mg     1.9     11-06    TELE: AFib  bpm    RADIOLOGY & ADDITIONAL TESTS:   12 Lead ECG (10.25.19 @ 23:30)  Ventricular Rate 159 BPM    Atrial Rate 166 BPM    QRS Duration 100 ms    Q-T Interval 248 ms    QTC Calculation(Bezet) 403 ms    R Axis 101 degrees    T Axis -51 degrees    Diagnosis Line Atrial fibrillation with rapid ventricular response  Rightward axis  Nonspecific T wave abnormality  Abnormal ECG    Confirmed by YAZMIN CHACON MD ([id number]) on 10/26/2019 10:59:12 AM    Xray Chest 1 View- PORTABLE-Routine (10.29.19 @ 11:38)  EXAM:  XR CHEST PORTABLE ROUTINE 1V            PROCEDURE DATE:  10/29/2019      INTERPRETATION:  Clinical History/Reason for Exam:  Shortness of Breath  Comparison : Chest x-ray-  10/26/2019      Findings:    Technique/Positioning:  Frontal film partial lordotic view    Support devices:  Leads overlie thorax obscuring anatomy    Cardiac/mediastinum/hilum: Stable cardiomegaly    Lung parenchyma/ Pleura: Reticular density left base consistent with   discoid atelectasis. No consolidation effusion or pneumothorax      Skeleton/soft tissues: Stable      Impression:    Reticular density left base consistent with discoid atelectasis. No   consolidation effusion or pneumothorax    AROLDO GODWIN M.D., ATTENDING RADIOLOGIST  This document has been electronically signed. Oct 29 2019  4:36PM

## 2019-11-06 NOTE — PROGRESS NOTE ADULT - SUBJECTIVE AND OBJECTIVE BOX
ADAL FRENCH 54y Male  MRN#: [id number]     SUBJECTIVE  Patient is a 54y old Male who presents with a chief complaint of Cellulitis (05 Nov 2019 09:01)  Today is hospital day 11d, and this morning he is lying in bed without distress.   No acute overnight events.   Feels well.  wants to go home  no chest pain no SOB  no abdominal pain    OBJECTIVE  PAST MEDICAL & SURGICAL HISTORY  Atrial fibrillation and flutter  History of prostate surgery  S/P ablation of atrial fibrillation  H/O skin graft    ALLERGIES:  Augmentin (Other)  clindamycin (Other)  penicillins (Other)    MEDICATIONS:  STANDING MEDICATIONS  apixaban 5 milliGRAM(s) Oral every 12 hours  clotrimazole 1% Cream 1 Application(s) Topical every 12 hours  digoxin     Tablet 0.25 milliGRAM(s) Oral daily  diltiazem    milliGRAM(s) Oral daily  furosemide    Tablet 40 milliGRAM(s) Oral daily  magnesium oxide 400 milliGRAM(s) Oral three times a day with meals  metolazone 2.5 milliGRAM(s) Oral <User Schedule>  metoprolol tartrate 50 milliGRAM(s) Oral every 6 hours  senna 1 Tablet(s) Oral daily    PRN MEDICATIONS  bisacodyl 5 milliGRAM(s) Oral every 12 hours PRN  diphenhydrAMINE 50 milliGRAM(s) Oral every 6 hours PRN  ondansetron Injectable 4 milliGRAM(s) IV Push once PRN  oxycodone    5 mG/acetaminophen 325 mG 2 Tablet(s) Oral every 4 hours PRN    HOME MEDICATIONS  Home Medications:      VITAL SIGNS: Last 24 Hours  T(C): 36.1 (06 Nov 2019 05:54), Max: 36.8 (05 Nov 2019 13:09)  T(F): 97 (06 Nov 2019 05:54), Max: 98.3 (05 Nov 2019 13:09)  HR: 79 (06 Nov 2019 11:28) (79 - 104)  BP: 143/72 (06 Nov 2019 11:28) (124/72 - 143/72)  BP(mean): --  RR: 18 (06 Nov 2019 05:54) (18 - 18)  SpO2: --    11-05-19 @ 07:01  -  11-06-19 @ 07:00  --------------------------------------------------------  IN: 800 mL / OUT: 3300 mL / NET: -2500 mL        LABS:                        13.4   7.38  )-----------( 155      ( 06 Nov 2019 06:10 )             43.6     11-06    140  |  96<L>  |  22<H>  ----------------------------<  93  4.2   |  30  |  1.3    Ca    8.6      06 Nov 2019 06:10  Mg     1.9     11-06                      CAPILLARY BLOOD GLUCOSE          RADIOLOGY:    PHYSICAL EXAM:  PHYSICAL EXAM:  GENERAL: NAD, AAO x 4, 54y M, obese  CHEST/LUNG: Clear to auscultation bilaterally; No wheeze; No crackles; No accessory muscles used  HEART: irrgualr  ABDOMEN: Soft, Nontender, Nondistended; Bowel sounds present; No guarding  EXTREMITIES: large pitting edema bilateral lower extremities. Large braod macular papular smelly lesion erythematous with satelite lesion      ADMISSION SUMMARY  Patient is a 54y old Male who presents with a chief complaint of Cellulitis (05 Nov 2019 09:01)   na

## 2019-11-13 DIAGNOSIS — N18.3 CHRONIC KIDNEY DISEASE, STAGE 3 (MODERATE): ICD-10-CM

## 2019-11-13 DIAGNOSIS — N50.89 OTHER SPECIFIED DISORDERS OF THE MALE GENITAL ORGANS: ICD-10-CM

## 2019-11-13 DIAGNOSIS — E83.42 HYPOMAGNESEMIA: ICD-10-CM

## 2019-11-13 DIAGNOSIS — I48.91 UNSPECIFIED ATRIAL FIBRILLATION: ICD-10-CM

## 2019-11-13 DIAGNOSIS — L03.119 CELLULITIS OF UNSPECIFIED PART OF LIMB: ICD-10-CM

## 2019-11-13 DIAGNOSIS — I50.21 ACUTE SYSTOLIC (CONGESTIVE) HEART FAILURE: ICD-10-CM

## 2019-11-13 DIAGNOSIS — N17.9 ACUTE KIDNEY FAILURE, UNSPECIFIED: ICD-10-CM

## 2019-11-13 DIAGNOSIS — Z88.0 ALLERGY STATUS TO PENICILLIN: ICD-10-CM

## 2019-11-13 DIAGNOSIS — Z88.1 ALLERGY STATUS TO OTHER ANTIBIOTIC AGENTS STATUS: ICD-10-CM

## 2019-11-13 DIAGNOSIS — B35.6 TINEA CRURIS: ICD-10-CM

## 2019-11-13 DIAGNOSIS — I48.92 UNSPECIFIED ATRIAL FLUTTER: ICD-10-CM

## 2019-11-13 DIAGNOSIS — I51.3 INTRACARDIAC THROMBOSIS, NOT ELSEWHERE CLASSIFIED: ICD-10-CM

## 2019-11-13 DIAGNOSIS — I42.0 DILATED CARDIOMYOPATHY: ICD-10-CM

## 2019-11-13 DIAGNOSIS — D64.9 ANEMIA, UNSPECIFIED: ICD-10-CM

## 2019-11-13 DIAGNOSIS — E66.2 MORBID (SEVERE) OBESITY WITH ALVEOLAR HYPOVENTILATION: ICD-10-CM

## 2019-11-13 DIAGNOSIS — I47.2 VENTRICULAR TACHYCARDIA: ICD-10-CM

## 2019-12-05 ENCOUNTER — APPOINTMENT (OUTPATIENT)
Dept: CARDIOLOGY | Facility: CLINIC | Age: 54
End: 2019-12-05
Payer: COMMERCIAL

## 2019-12-05 VITALS
BODY MASS INDEX: 45.1 KG/M2 | DIASTOLIC BLOOD PRESSURE: 70 MMHG | WEIGHT: 315 LBS | SYSTOLIC BLOOD PRESSURE: 120 MMHG | HEART RATE: 106 BPM | HEIGHT: 70 IN

## 2019-12-05 DIAGNOSIS — Z82.49 FAMILY HISTORY OF ISCHEMIC HEART DISEASE AND OTHER DISEASES OF THE CIRCULATORY SYSTEM: ICD-10-CM

## 2019-12-05 PROCEDURE — 99214 OFFICE O/P EST MOD 30 MIN: CPT

## 2019-12-05 PROCEDURE — 93000 ELECTROCARDIOGRAM COMPLETE: CPT

## 2019-12-05 NOTE — HISTORY OF PRESENT ILLNESS
[FreeTextEntry1] : Patient with hsitory of obesity, AFL ablation 2015, patien came in to hospital for new onset AF with RVR and found to have JERALD thrombus. Pt is now rate controlled. Pt some SOB and BUCKLEY. Started on AC\par \par \par \par EKG  bpm

## 2019-12-05 NOTE — PHYSICAL EXAM
[General Appearance - Well Developed] : well developed [Normal Appearance] : normal appearance [Well Groomed] : well groomed [General Appearance - Well Nourished] : well nourished [General Appearance - In No Acute Distress] : no acute distress [No Deformities] : no deformities [Normal Conjunctiva] : the conjunctiva exhibited no abnormalities [Eyelids - No Xanthelasma] : the eyelids demonstrated no xanthelasmas [Normal Oral Mucosa] : normal oral mucosa [No Oral Pallor] : no oral pallor [No Oral Cyanosis] : no oral cyanosis [Normal Jugular Venous A Waves Present] : normal jugular venous A waves present [Normal Jugular Venous V Waves Present] : normal jugular venous V waves present [No Jugular Venous Oliveira A Waves] : no jugular venous oliveira A waves [Respiration, Rhythm And Depth] : normal respiratory rhythm and effort [Exaggerated Use Of Accessory Muscles For Inspiration] : no accessory muscle use [Auscultation Breath Sounds / Voice Sounds] : lungs were clear to auscultation bilaterally [Heart Rate And Rhythm] : heart rate and rhythm were normal [Heart Sounds] : normal S1 and S2 [Murmurs] : no murmurs present [Abdomen Soft] : soft [Abdomen Mass (___ Cm)] : no abdominal mass palpated [Abdomen Tenderness] : non-tender [Petechial Hemorrhages (___cm)] : no petechial hemorrhages [Nail Clubbing] : no clubbing of the fingernails [Cyanosis, Localized] : no localized cyanosis [] : no ischemic changes

## 2019-12-13 NOTE — PRE-ANESTHESIA EVALUATION ADULT - RESPIRATORY RATE (BREATHS/MIN)
Called patient to follow up on his decision on Opdivo. He states he is seeing his  PCP this afternoon and will discuss with him. He states he will let us know his decision this afternoon.   
Called pt and informed about appts. Pt voiced understanding   
Called pt, he states that he does want to do treatment.   
Ordered Opdivo for next Wednesday.  Will need to see him with first cycle of Opdivo.  Thank you
18
18

## 2019-12-18 ENCOUNTER — OUTPATIENT (OUTPATIENT)
Dept: OUTPATIENT SERVICES | Facility: HOSPITAL | Age: 54
LOS: 1 days | Discharge: HOME | End: 2019-12-18
Payer: COMMERCIAL

## 2019-12-18 VITALS
OXYGEN SATURATION: 97 % | SYSTOLIC BLOOD PRESSURE: 139 MMHG | DIASTOLIC BLOOD PRESSURE: 76 MMHG | HEIGHT: 69 IN | HEART RATE: 95 BPM | WEIGHT: 315 LBS | TEMPERATURE: 96 F | RESPIRATION RATE: 18 BRPM

## 2019-12-18 DIAGNOSIS — Z98.84 BARIATRIC SURGERY STATUS: Chronic | ICD-10-CM

## 2019-12-18 DIAGNOSIS — Z01.818 ENCOUNTER FOR OTHER PREPROCEDURAL EXAMINATION: ICD-10-CM

## 2019-12-18 DIAGNOSIS — Z98.890 OTHER SPECIFIED POSTPROCEDURAL STATES: Chronic | ICD-10-CM

## 2019-12-18 DIAGNOSIS — I48.0 PAROXYSMAL ATRIAL FIBRILLATION: ICD-10-CM

## 2019-12-18 DIAGNOSIS — Z94.5 SKIN TRANSPLANT STATUS: Chronic | ICD-10-CM

## 2019-12-18 LAB
ALBUMIN SERPL ELPH-MCNC: 4.2 G/DL — SIGNIFICANT CHANGE UP (ref 3.5–5.2)
ALP SERPL-CCNC: 89 U/L — SIGNIFICANT CHANGE UP (ref 30–115)
ALT FLD-CCNC: 15 U/L — SIGNIFICANT CHANGE UP (ref 0–41)
ANION GAP SERPL CALC-SCNC: 13 MMOL/L — SIGNIFICANT CHANGE UP (ref 7–14)
APTT BLD: 32.8 SEC — SIGNIFICANT CHANGE UP (ref 27–39.2)
AST SERPL-CCNC: 12 U/L — SIGNIFICANT CHANGE UP (ref 0–41)
BASOPHILS # BLD AUTO: 0.07 K/UL — SIGNIFICANT CHANGE UP (ref 0–0.2)
BASOPHILS NFR BLD AUTO: 0.6 % — SIGNIFICANT CHANGE UP (ref 0–1)
BILIRUB SERPL-MCNC: 0.7 MG/DL — SIGNIFICANT CHANGE UP (ref 0.2–1.2)
BUN SERPL-MCNC: 24 MG/DL — HIGH (ref 10–20)
CALCIUM SERPL-MCNC: 9.6 MG/DL — SIGNIFICANT CHANGE UP (ref 8.5–10.1)
CHLORIDE SERPL-SCNC: 100 MMOL/L — SIGNIFICANT CHANGE UP (ref 98–110)
CO2 SERPL-SCNC: 26 MMOL/L — SIGNIFICANT CHANGE UP (ref 17–32)
CREAT SERPL-MCNC: 1 MG/DL — SIGNIFICANT CHANGE UP (ref 0.7–1.5)
DIGOXIN SERPL-MCNC: 0.7 NG/ML — LOW (ref 0.8–2)
EOSINOPHIL # BLD AUTO: 0.29 K/UL — SIGNIFICANT CHANGE UP (ref 0–0.7)
EOSINOPHIL NFR BLD AUTO: 2.4 % — SIGNIFICANT CHANGE UP (ref 0–8)
GLUCOSE SERPL-MCNC: 104 MG/DL — HIGH (ref 70–99)
HCT VFR BLD CALC: 41.4 % — LOW (ref 42–52)
HGB BLD-MCNC: 12.8 G/DL — LOW (ref 14–18)
IMM GRANULOCYTES NFR BLD AUTO: 0.6 % — HIGH (ref 0.1–0.3)
INR BLD: 1.32 RATIO — HIGH (ref 0.65–1.3)
LYMPHOCYTES # BLD AUTO: 1.27 K/UL — SIGNIFICANT CHANGE UP (ref 1.2–3.4)
LYMPHOCYTES # BLD AUTO: 10.3 % — LOW (ref 20.5–51.1)
MCHC RBC-ENTMCNC: 27.2 PG — SIGNIFICANT CHANGE UP (ref 27–31)
MCHC RBC-ENTMCNC: 30.9 G/DL — LOW (ref 32–37)
MCV RBC AUTO: 88.1 FL — SIGNIFICANT CHANGE UP (ref 80–94)
MONOCYTES # BLD AUTO: 1.21 K/UL — HIGH (ref 0.1–0.6)
MONOCYTES NFR BLD AUTO: 9.8 % — HIGH (ref 1.7–9.3)
NEUTROPHILS # BLD AUTO: 9.39 K/UL — HIGH (ref 1.4–6.5)
NEUTROPHILS NFR BLD AUTO: 76.3 % — HIGH (ref 42.2–75.2)
NRBC # BLD: 0 /100 WBCS — SIGNIFICANT CHANGE UP (ref 0–0)
PLATELET # BLD AUTO: 193 K/UL — SIGNIFICANT CHANGE UP (ref 130–400)
POTASSIUM SERPL-MCNC: 4.6 MMOL/L — SIGNIFICANT CHANGE UP (ref 3.5–5)
POTASSIUM SERPL-SCNC: 4.6 MMOL/L — SIGNIFICANT CHANGE UP (ref 3.5–5)
PROT SERPL-MCNC: 7.2 G/DL — SIGNIFICANT CHANGE UP (ref 6–8)
PROTHROM AB SERPL-ACNC: 15.1 SEC — HIGH (ref 9.95–12.87)
RBC # BLD: 4.7 M/UL — SIGNIFICANT CHANGE UP (ref 4.7–6.1)
RBC # FLD: 17.2 % — HIGH (ref 11.5–14.5)
SODIUM SERPL-SCNC: 139 MMOL/L — SIGNIFICANT CHANGE UP (ref 135–146)
WBC # BLD: 12.31 K/UL — HIGH (ref 4.8–10.8)
WBC # FLD AUTO: 12.31 K/UL — HIGH (ref 4.8–10.8)

## 2019-12-18 PROCEDURE — 93010 ELECTROCARDIOGRAM REPORT: CPT

## 2019-12-18 NOTE — H&P PST ADULT - HISTORY OF PRESENT ILLNESS
54 yaer old male proceeding for a cardioversion and transesophageal echocardiogram with Dr. Redd on 12/30/19. Today, the patient presents to [facility] for preop evaluation in preparation for scheduled surgery/procedure. The patient denies chest pains, palpitations, cough or dysuria with ambulation. Ambulates with a cane without BUCKLEY, CP or palpitations, +SOB

## 2019-12-18 NOTE — H&P PST ADULT - NSICDXPASTMEDICALHX_GEN_ALL_CORE_FT
PAST MEDICAL HISTORY:  Atrial fibrillation and flutter     H/O cardiomegaly     Hypertensive heart disease     Obesity, morbid, BMI 50 or higher     Thrombus JERALD 10/2019

## 2019-12-18 NOTE — H&P PST ADULT - NSANTHOSAYNRD_GEN_A_CORE
No. MARTIN screening performed.  STOP BANG Legend: 0-2 = LOW Risk; 3-4 = INTERMEDIATE Risk; 5-8 = HIGH Risk

## 2019-12-18 NOTE — H&P PST ADULT - NSICDXPASTSURGICALHX_GEN_ALL_CORE_FT
PAST SURGICAL HISTORY:  H/O laparoscopic adjustable gastric banding 2010    H/O skin graft     S/P ablation of atrial fibrillation

## 2019-12-19 PROBLEM — Z86.79 PERSONAL HISTORY OF OTHER DISEASES OF THE CIRCULATORY SYSTEM: Chronic | Status: ACTIVE | Noted: 2019-12-18

## 2019-12-19 PROBLEM — I82.90 ACUTE EMBOLISM AND THROMBOSIS OF UNSPECIFIED VEIN: Chronic | Status: ACTIVE | Noted: 2019-12-18

## 2019-12-19 PROBLEM — I11.9 HYPERTENSIVE HEART DISEASE WITHOUT HEART FAILURE: Chronic | Status: ACTIVE | Noted: 2019-12-18

## 2019-12-19 PROBLEM — E66.01 MORBID (SEVERE) OBESITY DUE TO EXCESS CALORIES: Chronic | Status: ACTIVE | Noted: 2019-12-18

## 2019-12-29 ENCOUNTER — FORM ENCOUNTER (OUTPATIENT)
Age: 54
End: 2019-12-29

## 2019-12-30 ENCOUNTER — OUTPATIENT (OUTPATIENT)
Dept: OUTPATIENT SERVICES | Facility: HOSPITAL | Age: 54
LOS: 1 days | Discharge: HOME | End: 2019-12-30
Payer: COMMERCIAL

## 2019-12-30 VITALS — WEIGHT: 315 LBS

## 2019-12-30 DIAGNOSIS — Z94.5 SKIN TRANSPLANT STATUS: Chronic | ICD-10-CM

## 2019-12-30 DIAGNOSIS — Z98.84 BARIATRIC SURGERY STATUS: Chronic | ICD-10-CM

## 2019-12-30 DIAGNOSIS — Z98.890 OTHER SPECIFIED POSTPROCEDURAL STATES: Chronic | ICD-10-CM

## 2019-12-30 DIAGNOSIS — I48.0 PAROXYSMAL ATRIAL FIBRILLATION: ICD-10-CM

## 2019-12-30 PROCEDURE — 93312 ECHO TRANSESOPHAGEAL: CPT | Mod: 26

## 2019-12-30 PROCEDURE — 92960 CARDIOVERSION ELECTRIC EXT: CPT

## 2019-12-30 PROCEDURE — 93320 DOPPLER ECHO COMPLETE: CPT | Mod: 26

## 2019-12-30 RX ORDER — APIXABAN 2.5 MG/1
1 TABLET, FILM COATED ORAL
Qty: 60 | Refills: 0
Start: 2019-12-30 | End: 2020-01-28

## 2019-12-30 NOTE — PRE-ANESTHESIA EVALUATION ADULT - NSRADCARDRESULTSFT_GEN_ALL_CORE
Summary:   1. Left ventricular ejection fraction, by visual estimation, is 25 to   30%.   2. Severely decreased global left ventricular systolic function.   3. Multiple left ventricular regional wall motion abnormalities exist.   See wall motion findings.   4. Elevated left atrial and left ventricular end-diastolic pressures.   5. Mildly increased left ventricular internal cavity size.   6. Spectral Doppler shows restrictive pattern of left ventricular   myocardial filling (Grade III diastolic dysfunction).   7. Moderately enlarged right ventricle.   8. Moderately reduced RV systolic function.   9. Moderately enlarged right atrium.  10. Moderate to severe mitral valve regurgitation.  11. LA volume Index is 50.0 ml/m² ml/m2.

## 2020-01-02 DIAGNOSIS — Z88.1 ALLERGY STATUS TO OTHER ANTIBIOTIC AGENTS STATUS: ICD-10-CM

## 2020-01-02 DIAGNOSIS — E66.01 MORBID (SEVERE) OBESITY DUE TO EXCESS CALORIES: ICD-10-CM

## 2020-01-02 DIAGNOSIS — Z79.01 LONG TERM (CURRENT) USE OF ANTICOAGULANTS: ICD-10-CM

## 2020-01-02 DIAGNOSIS — I48.91 UNSPECIFIED ATRIAL FIBRILLATION: ICD-10-CM

## 2020-01-02 DIAGNOSIS — I10 ESSENTIAL (PRIMARY) HYPERTENSION: ICD-10-CM

## 2020-01-08 ENCOUNTER — MEDICATION RENEWAL (OUTPATIENT)
Age: 55
End: 2020-01-08

## 2020-01-08 RX ORDER — DILTIAZEM HYDROCHLORIDE 240 MG/1
240 CAPSULE, EXTENDED RELEASE ORAL DAILY
Refills: 0 | Status: DISCONTINUED | COMMUNITY
End: 2020-01-08

## 2020-01-08 RX ORDER — DIGOXIN 250 UG/1
250 TABLET ORAL DAILY
Refills: 0 | Status: DISCONTINUED | COMMUNITY
End: 2020-01-08

## 2020-01-23 ENCOUNTER — APPOINTMENT (OUTPATIENT)
Dept: CARDIOLOGY | Facility: CLINIC | Age: 55
End: 2020-01-23
Payer: COMMERCIAL

## 2020-01-23 VITALS
DIASTOLIC BLOOD PRESSURE: 80 MMHG | SYSTOLIC BLOOD PRESSURE: 122 MMHG | HEART RATE: 80 BPM | HEIGHT: 70 IN | WEIGHT: 315 LBS | BODY MASS INDEX: 45.1 KG/M2

## 2020-01-23 DIAGNOSIS — I51.3 INTRACARDIAC THROMBOSIS, NOT ELSEWHERE CLASSIFIED: ICD-10-CM

## 2020-01-23 DIAGNOSIS — I48.91 UNSPECIFIED ATRIAL FIBRILLATION: ICD-10-CM

## 2020-01-23 DIAGNOSIS — I10 ESSENTIAL (PRIMARY) HYPERTENSION: ICD-10-CM

## 2020-01-23 PROCEDURE — 93000 ELECTROCARDIOGRAM COMPLETE: CPT

## 2020-01-23 PROCEDURE — 99214 OFFICE O/P EST MOD 30 MIN: CPT

## 2020-01-23 NOTE — PHYSICAL EXAM
[General Appearance - Well Developed] : well developed [Well Groomed] : well groomed [Normal Appearance] : normal appearance [No Deformities] : no deformities [General Appearance - Well Nourished] : well nourished [General Appearance - In No Acute Distress] : no acute distress [Normal Conjunctiva] : the conjunctiva exhibited no abnormalities [Eyelids - No Xanthelasma] : the eyelids demonstrated no xanthelasmas [Normal Oral Mucosa] : normal oral mucosa [No Oral Cyanosis] : no oral cyanosis [No Oral Pallor] : no oral pallor [Normal Jugular Venous A Waves Present] : normal jugular venous A waves present [Normal Jugular Venous V Waves Present] : normal jugular venous V waves present [No Jugular Venous Oliveira A Waves] : no jugular venous oliveira A waves [Respiration, Rhythm And Depth] : normal respiratory rhythm and effort [Exaggerated Use Of Accessory Muscles For Inspiration] : no accessory muscle use [Auscultation Breath Sounds / Voice Sounds] : lungs were clear to auscultation bilaterally [Heart Rate And Rhythm] : heart rate and rhythm were normal [Heart Sounds] : normal S1 and S2 [Murmurs] : no murmurs present [Abdomen Soft] : soft [Abdomen Tenderness] : non-tender [Abdomen Mass (___ Cm)] : no abdominal mass palpated [Nail Clubbing] : no clubbing of the fingernails [Cyanosis, Localized] : no localized cyanosis [Petechial Hemorrhages (___cm)] : no petechial hemorrhages [] : no ischemic changes

## 2020-01-23 NOTE — HISTORY OF PRESENT ILLNESS
[FreeTextEntry1] : Patient with hsitory of obesity, AFL ablation 2015, patien came in to hospital for new onset AF with RVR and found to have JERALD thrombus. Pt is now rate controlled. Pt some SOB and BUCKLEY. Started on AC\par \par Patient s/p DCCV/ BO (no clot) but 1 week later converted back to AF. Pt feel better now bc he is rate controlled\par \par \par \par EKG AF 80 bpm

## 2020-01-23 NOTE — ASSESSMENT
[FreeTextEntry1] : AF - rate controlled\par - pt feels better\par - cont meds for rate control\par - cont AC\par - Discussed possibility of ablation if pt looses weight \par - encourage weight loss; pt is looking in to surgery

## 2020-04-05 NOTE — PHYSICAL THERAPY INITIAL EVALUATION ADULT - PHYSICAL ASSIST/NONPHYSICAL ASSIST: SIT/SUPINE, REHAB EVAL
2 person assist/assist on to elevate B/L lower extremities to bed Abdomen soft, non-tender, no guarding.

## 2020-06-15 RX ORDER — DILTIAZEM HYDROCHLORIDE 300 MG/1
300 TABLET, EXTENDED RELEASE ORAL DAILY
Qty: 90 | Refills: 3 | Status: DISCONTINUED | COMMUNITY
Start: 2020-01-08 | End: 2020-06-15

## 2020-09-24 ENCOUNTER — APPOINTMENT (OUTPATIENT)
Dept: CARDIOLOGY | Facility: CLINIC | Age: 55
End: 2020-09-24

## 2021-01-25 ENCOUNTER — NON-APPOINTMENT (OUTPATIENT)
Age: 56
End: 2021-01-25

## 2021-01-28 ENCOUNTER — APPOINTMENT (OUTPATIENT)
Dept: CARDIOLOGY | Facility: CLINIC | Age: 56
End: 2021-01-28

## 2021-02-19 ENCOUNTER — RX RENEWAL (OUTPATIENT)
Age: 56
End: 2021-02-19

## 2021-10-18 ENCOUNTER — RX RENEWAL (OUTPATIENT)
Age: 56
End: 2021-10-18

## 2021-10-18 RX ORDER — DILTIAZEM HYDROCHLORIDE 300 MG/1
300 CAPSULE, EXTENDED RELEASE ORAL
Qty: 90 | Refills: 3 | Status: ACTIVE | COMMUNITY
Start: 2021-10-18 | End: 1900-01-01

## 2021-10-21 RX ORDER — LISINOPRIL 2.5 MG/1
2.5 TABLET ORAL
Qty: 30 | Refills: 0 | Status: ACTIVE | COMMUNITY
Start: 2021-02-19 | End: 1900-01-01

## 2021-10-21 RX ORDER — DILTIAZEM HYDROCHLORIDE 300 MG/1
300 CAPSULE, EXTENDED RELEASE ORAL
Qty: 30 | Refills: 0 | Status: ACTIVE | COMMUNITY
Start: 2020-06-15 | End: 1900-01-01

## 2021-10-21 RX ORDER — METOPROLOL SUCCINATE 200 MG/1
200 TABLET, EXTENDED RELEASE ORAL
Qty: 30 | Refills: 0 | Status: ACTIVE | COMMUNITY
Start: 1900-01-01 | End: 1900-01-01

## 2021-10-21 RX ORDER — APIXABAN 5 MG/1
5 TABLET, FILM COATED ORAL
Qty: 60 | Refills: 0 | Status: ACTIVE | COMMUNITY
Start: 1900-01-01 | End: 1900-01-01

## 2025-07-15 ENCOUNTER — NON-APPOINTMENT (OUTPATIENT)
Age: 60
End: 2025-07-15